# Patient Record
Sex: MALE | Race: WHITE | NOT HISPANIC OR LATINO | URBAN - NONMETROPOLITAN AREA
[De-identification: names, ages, dates, MRNs, and addresses within clinical notes are randomized per-mention and may not be internally consistent; named-entity substitution may affect disease eponyms.]

---

## 2023-05-30 ENCOUNTER — TRANSCRIBE ORDERS (OUTPATIENT)
Dept: CARDIOLOGY CLINIC | Facility: CLINIC | Age: 58
End: 2023-05-30

## 2023-05-30 ENCOUNTER — TELEPHONE (OUTPATIENT)
Dept: UROLOGY | Facility: CLINIC | Age: 58
End: 2023-05-30

## 2023-05-30 DIAGNOSIS — R35.1 NOCTURIA: Primary | ICD-10-CM

## 2023-06-01 ENCOUNTER — TELEPHONE (OUTPATIENT)
Dept: UROLOGY | Facility: MEDICAL CENTER | Age: 58
End: 2023-06-01

## 2023-06-01 NOTE — TELEPHONE ENCOUNTER
Please Triage -   New Patient- Mary    What is the reason for the patients appointment? patient was referred to see Urology for Nocturia        Imaging/Lab Results:      Do we accept the patient's insurance or is the patient Self-Pay? Provider & Plan:pam   Member ID#: He will call back w insurance   Has the patient had any previous urologist(s)?no        Have patient records been requested?ine pic       Has the patient had any outside testing done?   In epic     Does the patient have a personal history of cancer?no       Patient can be reached at :
Detail Level: Zone

## 2023-06-24 PROBLEM — R35.1 NOCTURIA: Status: ACTIVE | Noted: 2023-06-24

## 2023-06-24 NOTE — PROGRESS NOTES
UROLOGY PROGRESS NOTE         NAME: Autumn Toro  AGE: 59 y o  SEX: male  : 1965   MRN: [id number]    DATE: 2023  TIME: 9:12 AM    Assessment and Plan   Procedures     Impression:   1  Nocturia  -     Ambulatory referral to Urology    2  Erectile dysfunction, unspecified erectile dysfunction type    3  Dysuria    4  Urinary urgency         Plan: We will check a urine culture, will start oxybutynin to see if it helps his voiding symptoms  Patient is going to try his best to get his A1c and diabetes controlled  Regarding his Viagra I recommend him taking 20 mg tablets an hour before foreplay on an empty stomach and see if that improves  So we will check a PSA reevaluate patient in 3 months    Chief Complaint   No chief complaint on file  History of Present Illness     HPI: Autumn Toro is a 59y o  year old male who presents with evaluation for nocturia  Per telephone call no previous  medical or surgical history  Do not see any recent PSAs on the chart    Patient complains of suprapubic pressure for about a year good flow no hematuria no dysuria some postvoid dribbling some urgency frequency variable nocturia 1-4  The following portions of the patient's history were reviewed and updated as appropriate: allergies, current medications, past family history, past medical history, past social history, past surgical history and problem list   Past Medical History:   Diagnosis Date   • Dyslipidemia    • Hypertension    • Type 2 diabetes mellitus ([facility] [address] )      Past Surgical History:   Procedure Laterality Date   • ANKLE SURGERY Left    • COLONOSCOPY       shoulder  Review of Systems     Const: Denies chills, fever and weight loss  CV: Denies chest pain  Resp: Denies SOB  GI: Denies abdominal pain, nausea and vomiting  : Denies symptoms other than stated above  Musculo: Denies back pain      Objective   /80 (BP Location: Left arm, Patient Position: Sitting, Cuff Size: "Standard)   Pulse 74   Temp (!) 97 2 °F (36 2 °C)   Ht 5' 8\" (1 727 m)   Wt 110 kg (243 lb)   SpO2 98%   BMI 36 95 kg/m²     Physical Exam  Const: Appears healthy and well developed  No signs of acute distress present  Resp: Respirations are regular and unlabored  CV: Rate is regular  Rhythm is regular  Abdomen: Abdomen is soft, nontender, and nondistended  Kidneys are not palpable  : He had a normal external genitalia exam the prostate was really normal size consistency no mass or nodule  Psych: Patient's attitude is cooperative   Mood is normal  Affect is normal     Current Medications     Current Outpatient Medications:   •  amLODIPine (NORVASC) 5 mg tablet, Take 10 mg by mouth daily, Disp: , Rfl:   •  Aspirin 81 MG CAPS, Take by mouth, Disp: , Rfl:   •  ergocalciferol (VITAMIN D2) 50,000 units, TAKE 1 CAPSULE BY MOUTH TWICE MONTHLY, Disp: , Rfl:   •  glimepiride (AMARYL) 4 mg tablet, , Disp: , Rfl:   •  Jardiance 25 MG TABS, Take 10 mg by mouth every morning, Disp: , Rfl:   •  losartan (COZAAR) 50 mg tablet, Take 100 mg by mouth daily, Disp: , Rfl:   •  metFORMIN (GLUCOPHAGE) 500 mg tablet, Take 500 mg by mouth 2 (two) times a day with meals, Disp: , Rfl:   •  sildenafil (VIAGRA) 100 mg tablet, PLEASE SEE ATTACHED FOR DETAILED DIRECTIONS, Disp: , Rfl:         Stanley Davila MD        "

## 2023-06-26 ENCOUNTER — TELEPHONE (OUTPATIENT)
Dept: UROLOGY | Facility: CLINIC | Age: 58
End: 2023-06-26

## 2023-06-26 RX ORDER — SILDENAFIL 100 MG/1
TABLET, FILM COATED ORAL
COMMUNITY
Start: 2023-05-24

## 2023-06-26 RX ORDER — AMLODIPINE BESYLATE 5 MG/1
10 TABLET ORAL DAILY
COMMUNITY
Start: 2023-04-22

## 2023-06-26 RX ORDER — GLIMEPIRIDE 4 MG/1
TABLET ORAL
COMMUNITY
Start: 2023-05-25

## 2023-06-26 RX ORDER — EMPAGLIFLOZIN 25 MG/1
10 TABLET, FILM COATED ORAL EVERY MORNING
COMMUNITY
Start: 2023-05-24

## 2023-06-26 RX ORDER — ERGOCALCIFEROL 1.25 MG/1
CAPSULE ORAL
COMMUNITY
Start: 2023-03-21

## 2023-06-26 RX ORDER — LOSARTAN POTASSIUM 50 MG/1
100 TABLET ORAL DAILY
COMMUNITY

## 2023-06-28 ENCOUNTER — OFFICE VISIT (OUTPATIENT)
Dept: UROLOGY | Facility: CLINIC | Age: 58
End: 2023-06-28
Payer: COMMERCIAL

## 2023-06-28 VITALS
WEIGHT: 243 LBS | BODY MASS INDEX: 36.83 KG/M2 | HEIGHT: 68 IN | OXYGEN SATURATION: 98 % | TEMPERATURE: 97.2 F | HEART RATE: 74 BPM | DIASTOLIC BLOOD PRESSURE: 80 MMHG | SYSTOLIC BLOOD PRESSURE: 134 MMHG

## 2023-06-28 DIAGNOSIS — R30.0 DYSURIA: ICD-10-CM

## 2023-06-28 DIAGNOSIS — R35.1 NOCTURIA: Primary | ICD-10-CM

## 2023-06-28 DIAGNOSIS — N52.9 ERECTILE DYSFUNCTION, UNSPECIFIED ERECTILE DYSFUNCTION TYPE: ICD-10-CM

## 2023-06-28 DIAGNOSIS — R39.15 URINARY URGENCY: ICD-10-CM

## 2023-06-28 LAB
SL AMB  POCT GLUCOSE, UA: ABNORMAL
SL AMB LEUKOCYTE ESTERASE,UA: ABNORMAL
SL AMB POCT BILIRUBIN,UA: ABNORMAL
SL AMB POCT BLOOD,UA: ABNORMAL
SL AMB POCT CLARITY,UA: CLEAR
SL AMB POCT COLOR,UA: YELLOW
SL AMB POCT KETONES,UA: ABNORMAL
SL AMB POCT NITRITE,UA: ABNORMAL
SL AMB POCT PH,UA: 5
SL AMB POCT SPECIFIC GRAVITY,UA: 1.01
SL AMB POCT URINE PROTEIN: ABNORMAL
SL AMB POCT UROBILINOGEN: 0.2

## 2023-06-28 PROCEDURE — 99204 OFFICE O/P NEW MOD 45 MIN: CPT | Performed by: UROLOGY

## 2023-06-28 PROCEDURE — 87086 URINE CULTURE/COLONY COUNT: CPT | Performed by: UROLOGY

## 2023-06-28 PROCEDURE — 81003 URINALYSIS AUTO W/O SCOPE: CPT | Performed by: UROLOGY

## 2023-06-28 RX ORDER — OXYBUTYNIN CHLORIDE 10 MG/1
10 TABLET, EXTENDED RELEASE ORAL
Qty: 90 TABLET | Refills: 3 | Status: SHIPPED | OUTPATIENT
Start: 2023-06-28

## 2023-06-28 RX ORDER — SILDENAFIL CITRATE 20 MG/1
TABLET ORAL
Qty: 90 TABLET | Refills: 3 | Status: SHIPPED | OUTPATIENT
Start: 2023-06-28

## 2023-06-29 LAB — BACTERIA UR CULT: NORMAL

## 2023-07-12 ENCOUNTER — APPOINTMENT (OUTPATIENT)
Dept: LAB | Facility: HOSPITAL | Age: 58
End: 2023-07-12
Payer: COMMERCIAL

## 2023-07-12 DIAGNOSIS — R35.1 NOCTURIA: ICD-10-CM

## 2023-07-12 LAB — PSA SERPL-MCNC: 0.62 NG/ML (ref 0–4)

## 2023-07-12 PROCEDURE — 84153 ASSAY OF PSA TOTAL: CPT

## 2023-07-12 PROCEDURE — 36415 COLL VENOUS BLD VENIPUNCTURE: CPT

## 2023-10-06 NOTE — PROGRESS NOTES
UROLOGY PROGRESS NOTE         NAME: Tootie Giron  AGE: 59 y.o. SEX: male  : 1965   MRN: [id number]    DATE: 10/6/2023  TIME: 4:44 PM    Assessment and Plan   Procedures     Impression:   1. Nocturia    2. Erectile dysfunction, unspecified erectile dysfunction type    3. Urinary urgency    4. Dysuria         Plan: Change from sildenafil to tadalafil 20 mg dose. Set patient up for cystoscopy to evaluate suprapubic pressure in a.m. Also recommend a 14-day course of Mobic 15 mg and a heating pad to that area till the cystoscopy can be completed. Chief Complaint   No chief complaint on file. History of Present Illness     HPI: Tootie Giron is a 59y.o. year old male who presents with office visit with me on 2023. Patient with a history nocturia, ED, dysuria, urinary urgency. The plan was to start him on oxybutynin and recommended better diabetic control. He was recommended sildenafil for erectile dysfunction. His PSA 2023 was normal at 0.62 and urine culture at the time of the office visit was normal.  Currently, patient still with suprapubic pressure in the morning gets better during the day. Nocturia is improved down to 1. He states his sugar is improved as well. He is not having leakage no hematuria no dysuria his flows reasonably good. Regarding erectile dysfunction no luck with sildenafil is tried up to 4 tablets has a girlfriend.   His libido is good got a headache with the higher dose            The following portions of the patient's history were reviewed and updated as appropriate: allergies, current medications, past family history, past medical history, past social history, past surgical history and problem list.  Past Medical History:   Diagnosis Date    Dyslipidemia     Hypertension     Type 2 diabetes mellitus ([address])      Past Surgical History:   Procedure Laterality Date    ANKLE SURGERY Left     COLONOSCOPY       shoulder  Review of Systems     Const: Denies chills, fever and weight loss. CV: Denies chest pain. Resp: Denies SOB. GI: Denies abdominal pain, nausea and vomiting. : Denies symptoms other than stated above. Musculo: Denies back pain. Objective   There were no vitals taken for this visit. Physical Exam  Const: Appears healthy and well developed. No signs of acute distress present. Resp: Respirations are regular and unlabored. CV: Rate is regular. Rhythm is regular. Abdomen: Abdomen is soft, nontender, and nondistended. Kidneys are not palpable. : Normal external exam PVR was low there was suprapubic pressure  Psych: Patient's attitude is cooperative. Mood is normal. Affect is normal.    Current Medications     Current Outpatient Medications:     amLODIPine (NORVASC) 5 mg tablet, Take 10 mg by mouth daily, Disp: , Rfl:     Aspirin 81 MG CAPS, Take by mouth, Disp: , Rfl:     ergocalciferol (VITAMIN D2) 50,000 units, TAKE 1 CAPSULE BY MOUTH TWICE MONTHLY, Disp: , Rfl:     glimepiride (AMARYL) 4 mg tablet, , Disp: , Rfl:     Jardiance 25 MG TABS, Take 10 mg by mouth every morning, Disp: , Rfl:     losartan (COZAAR) 50 mg tablet, Take 100 mg by mouth daily, Disp: , Rfl:     metFORMIN (GLUCOPHAGE) 500 mg tablet, Take 500 mg by mouth 2 (two) times a day with meals, Disp: , Rfl:     oxybutynin (DITROPAN-XL) 10 MG 24 hr tablet, Take 1 tablet (10 mg total) by mouth daily at bedtime, Disp: 90 tablet, Rfl: 3    sildenafil (REVATIO) 20 mg tablet, Take anywhere from 1 to 5 tablets but no more than 5 tablets in a 24-hour period. Do not drink alcohol with this product and take on empty stomach or wait 2 hours after eating to take the medication. , Disp: 90 tablet, Rfl: 3    sildenafil (VIAGRA) 100 mg tablet, PLEASE SEE ATTACHED FOR DETAILED DIRECTIONS, Disp: , Rfl:         Robert Quinn MD

## 2023-10-09 RX ORDER — ROSUVASTATIN CALCIUM 10 MG/1
10 TABLET, COATED ORAL DAILY
COMMUNITY
Start: 2023-07-26

## 2023-10-09 RX ORDER — LORATADINE 10 MG/1
TABLET ORAL
COMMUNITY

## 2023-10-11 ENCOUNTER — OFFICE VISIT (OUTPATIENT)
Dept: UROLOGY | Facility: CLINIC | Age: 58
End: 2023-10-11
Payer: COMMERCIAL

## 2023-10-11 VITALS
TEMPERATURE: 98 F | WEIGHT: 229.2 LBS | OXYGEN SATURATION: 99 % | DIASTOLIC BLOOD PRESSURE: 60 MMHG | HEIGHT: 68 IN | BODY MASS INDEX: 34.74 KG/M2 | HEART RATE: 78 BPM | SYSTOLIC BLOOD PRESSURE: 142 MMHG

## 2023-10-11 DIAGNOSIS — R10.2 SUPRAPUBIC PRESSURE: ICD-10-CM

## 2023-10-11 DIAGNOSIS — E11.9 TYPE 2 DIABETES MELLITUS WITH HEMOGLOBIN A1C GOAL OF LESS THAN 7.0% (HCC): ICD-10-CM

## 2023-10-11 DIAGNOSIS — R35.1 NOCTURIA: Primary | ICD-10-CM

## 2023-10-11 DIAGNOSIS — R39.15 URINARY URGENCY: ICD-10-CM

## 2023-10-11 DIAGNOSIS — R30.0 DYSURIA: ICD-10-CM

## 2023-10-11 DIAGNOSIS — N52.9 ERECTILE DYSFUNCTION, UNSPECIFIED ERECTILE DYSFUNCTION TYPE: ICD-10-CM

## 2023-10-11 PROCEDURE — 99214 OFFICE O/P EST MOD 30 MIN: CPT | Performed by: UROLOGY

## 2023-10-11 RX ORDER — MELOXICAM 15 MG/1
15 TABLET ORAL DAILY
Qty: 14 TABLET | Refills: 0 | Status: SHIPPED | OUTPATIENT
Start: 2023-10-11

## 2023-10-11 RX ORDER — TADALAFIL 20 MG/1
20 TABLET ORAL DAILY PRN
Qty: 30 TABLET | Refills: 3 | Status: SHIPPED | OUTPATIENT
Start: 2023-10-11

## 2023-10-11 RX ORDER — BLOOD-GLUCOSE SENSOR
EACH MISCELLANEOUS
COMMUNITY
Start: 2023-08-14

## 2023-11-07 ENCOUNTER — APPOINTMENT (INPATIENT)
Dept: RADIOLOGY | Facility: HOSPITAL | Age: 58
DRG: 153 | End: 2023-11-07
Payer: COMMERCIAL

## 2023-11-07 ENCOUNTER — APPOINTMENT (INPATIENT)
Dept: MRI IMAGING | Facility: HOSPITAL | Age: 58
DRG: 153 | End: 2023-11-07
Payer: COMMERCIAL

## 2023-11-07 ENCOUNTER — HOSPITAL ENCOUNTER (INPATIENT)
Facility: HOSPITAL | Age: 58
LOS: 1 days | Discharge: HOME | DRG: 153 | End: 2023-11-08
Attending: EMERGENCY MEDICINE | Admitting: INTERNAL MEDICINE
Payer: COMMERCIAL

## 2023-11-07 ENCOUNTER — APPOINTMENT (INPATIENT)
Dept: NON INVASIVE DIAGNOSTICS | Facility: HOSPITAL | Age: 58
DRG: 153 | End: 2023-11-07
Payer: COMMERCIAL

## 2023-11-07 ENCOUNTER — APPOINTMENT (EMERGENCY)
Dept: CT IMAGING | Facility: HOSPITAL | Age: 58
DRG: 153 | End: 2023-11-07
Payer: COMMERCIAL

## 2023-11-07 DIAGNOSIS — R07.9 CHEST PAIN: ICD-10-CM

## 2023-11-07 DIAGNOSIS — R93.1 ABNORMAL ECHOCARDIOGRAM: ICD-10-CM

## 2023-11-07 DIAGNOSIS — J01.80 OTHER ACUTE SINUSITIS, RECURRENCE NOT SPECIFIED: ICD-10-CM

## 2023-11-07 DIAGNOSIS — I77.9 VERTEBRAL ARTERY DISEASE (HCC): ICD-10-CM

## 2023-11-07 DIAGNOSIS — R42 DIZZINESS: Primary | ICD-10-CM

## 2023-11-07 PROBLEM — M79.601 PARESTHESIA AND PAIN OF BOTH UPPER EXTREMITIES: Status: ACTIVE | Noted: 2023-11-07

## 2023-11-07 PROBLEM — R20.2 PARESTHESIA AND PAIN OF BOTH UPPER EXTREMITIES: Status: ACTIVE | Noted: 2023-11-07

## 2023-11-07 PROBLEM — I65.09 VERTEBRAL ARTERY STENOSIS: Status: ACTIVE | Noted: 2023-11-07

## 2023-11-07 PROBLEM — E78.5 HYPERLIPIDEMIA: Status: ACTIVE | Noted: 2023-11-07

## 2023-11-07 PROBLEM — M79.602 PARESTHESIA AND PAIN OF BOTH UPPER EXTREMITIES: Status: ACTIVE | Noted: 2023-11-07

## 2023-11-07 PROBLEM — I10 HYPERTENSION: Status: ACTIVE | Noted: 2023-11-07

## 2023-11-07 LAB
ANION GAP SERPL CALCULATED.3IONS-SCNC: 7 MMOL/L
APTT PPP: 28 SECONDS (ref 23–37)
ATRIAL RATE: 89 BPM
BUN SERPL-MCNC: 14 MG/DL (ref 5–25)
CALCIUM SERPL-MCNC: 9.3 MG/DL (ref 8.4–10.2)
CARDIAC TROPONIN I PNL SERPL HS: 2 NG/L
CHLORIDE SERPL-SCNC: 102 MMOL/L (ref 96–108)
CO2 SERPL-SCNC: 27 MMOL/L (ref 21–32)
CREAT SERPL-MCNC: 0.99 MG/DL (ref 0.6–1.3)
CRP SERPL QL: 1.6 MG/L
ERYTHROCYTE [DISTWIDTH] IN BLOOD BY AUTOMATED COUNT: 13.4 % (ref 11.6–15.1)
FLUAV RNA RESP QL NAA+PROBE: NEGATIVE
FLUBV RNA RESP QL NAA+PROBE: NEGATIVE
GFR SERPL CREATININE-BSD FRML MDRD: 82 ML/MIN/1.73SQ M
GLUCOSE SERPL-MCNC: 130 MG/DL (ref 65–140)
GLUCOSE SERPL-MCNC: 168 MG/DL (ref 65–140)
GLUCOSE SERPL-MCNC: 189 MG/DL (ref 65–140)
GLUCOSE SERPL-MCNC: 200 MG/DL (ref 65–140)
HCT VFR BLD AUTO: 48.2 % (ref 36.5–49.3)
HCYS SERPL-SCNC: 11.7 UMOL/L (ref 5–15)
HGB BLD-MCNC: 16.2 G/DL (ref 12–17)
INR PPP: 0.93 (ref 0.84–1.19)
MCH RBC QN AUTO: 30.4 PG (ref 26.8–34.3)
MCHC RBC AUTO-ENTMCNC: 33.6 G/DL (ref 31.4–37.4)
MCV RBC AUTO: 90 FL (ref 82–98)
P AXIS: 39 DEGREES
PLATELET # BLD AUTO: 229 THOUSANDS/UL (ref 149–390)
PMV BLD AUTO: 9.4 FL (ref 8.9–12.7)
POTASSIUM SERPL-SCNC: 5.1 MMOL/L (ref 3.5–5.3)
PR INTERVAL: 164 MS
PROTHROMBIN TIME: 12.8 SECONDS (ref 11.6–14.5)
QRS AXIS: -9 DEGREES
QRSD INTERVAL: 84 MS
QT INTERVAL: 350 MS
QTC INTERVAL: 425 MS
RBC # BLD AUTO: 5.33 MILLION/UL (ref 3.88–5.62)
RSV RNA RESP QL NAA+PROBE: NEGATIVE
SARS-COV-2 RNA RESP QL NAA+PROBE: NEGATIVE
SODIUM SERPL-SCNC: 136 MMOL/L (ref 135–147)
T WAVE AXIS: 40 DEGREES
TSH SERPL DL<=0.05 MIU/L-ACNC: 2.02 UIU/ML (ref 0.45–4.5)
VENTRICULAR RATE: 89 BPM
VIT B12 SERPL-MCNC: 303 PG/ML (ref 180–914)
WBC # BLD AUTO: 7.49 THOUSAND/UL (ref 4.31–10.16)

## 2023-11-07 PROCEDURE — 80048 BASIC METABOLIC PNL TOTAL CA: CPT | Performed by: EMERGENCY MEDICINE

## 2023-11-07 PROCEDURE — 70496 CT ANGIOGRAPHY HEAD: CPT

## 2023-11-07 PROCEDURE — 93005 ELECTROCARDIOGRAM TRACING: CPT

## 2023-11-07 PROCEDURE — 83090 ASSAY OF HOMOCYSTEINE: CPT | Performed by: INTERNAL MEDICINE

## 2023-11-07 PROCEDURE — 82948 REAGENT STRIP/BLOOD GLUCOSE: CPT

## 2023-11-07 PROCEDURE — 99285 EMERGENCY DEPT VISIT HI MDM: CPT | Performed by: EMERGENCY MEDICINE

## 2023-11-07 PROCEDURE — 93010 ELECTROCARDIOGRAM REPORT: CPT | Performed by: INTERNAL MEDICINE

## 2023-11-07 PROCEDURE — 36415 COLL VENOUS BLD VENIPUNCTURE: CPT | Performed by: EMERGENCY MEDICINE

## 2023-11-07 PROCEDURE — 85027 COMPLETE CBC AUTOMATED: CPT | Performed by: EMERGENCY MEDICINE

## 2023-11-07 PROCEDURE — 85610 PROTHROMBIN TIME: CPT | Performed by: EMERGENCY MEDICINE

## 2023-11-07 PROCEDURE — 84443 ASSAY THYROID STIM HORMONE: CPT | Performed by: INTERNAL MEDICINE

## 2023-11-07 PROCEDURE — 99285 EMERGENCY DEPT VISIT HI MDM: CPT

## 2023-11-07 PROCEDURE — 82607 VITAMIN B-12: CPT | Performed by: INTERNAL MEDICINE

## 2023-11-07 PROCEDURE — 93306 TTE W/DOPPLER COMPLETE: CPT | Performed by: STUDENT IN AN ORGANIZED HEALTH CARE EDUCATION/TRAINING PROGRAM

## 2023-11-07 PROCEDURE — 70551 MRI BRAIN STEM W/O DYE: CPT

## 2023-11-07 PROCEDURE — 85730 THROMBOPLASTIN TIME PARTIAL: CPT | Performed by: EMERGENCY MEDICINE

## 2023-11-07 PROCEDURE — 86140 C-REACTIVE PROTEIN: CPT | Performed by: INTERNAL MEDICINE

## 2023-11-07 PROCEDURE — 99223 1ST HOSP IP/OBS HIGH 75: CPT | Performed by: INTERNAL MEDICINE

## 2023-11-07 PROCEDURE — 70498 CT ANGIOGRAPHY NECK: CPT

## 2023-11-07 PROCEDURE — 0241U HB NFCT DS VIR RESP RNA 4 TRGT: CPT | Performed by: EMERGENCY MEDICINE

## 2023-11-07 PROCEDURE — 84484 ASSAY OF TROPONIN QUANT: CPT | Performed by: EMERGENCY MEDICINE

## 2023-11-07 PROCEDURE — 71045 X-RAY EXAM CHEST 1 VIEW: CPT

## 2023-11-07 PROCEDURE — 93306 TTE W/DOPPLER COMPLETE: CPT

## 2023-11-07 RX ORDER — AMLODIPINE BESYLATE 10 MG/1
10 TABLET ORAL DAILY
Status: DISCONTINUED | OUTPATIENT
Start: 2023-11-08 | End: 2023-11-08 | Stop reason: HOSPADM

## 2023-11-07 RX ORDER — ASPIRIN 81 MG/1
324 TABLET, CHEWABLE ORAL ONCE
Status: COMPLETED | OUTPATIENT
Start: 2023-11-07 | End: 2023-11-07

## 2023-11-07 RX ORDER — LORAZEPAM 2 MG/ML
1 INJECTION INTRAMUSCULAR ONCE AS NEEDED
Status: DISCONTINUED | OUTPATIENT
Start: 2023-11-07 | End: 2023-11-08 | Stop reason: HOSPADM

## 2023-11-07 RX ORDER — OXYBUTYNIN CHLORIDE 5 MG/1
10 TABLET, EXTENDED RELEASE ORAL
Status: DISCONTINUED | OUTPATIENT
Start: 2023-11-07 | End: 2023-11-08 | Stop reason: HOSPADM

## 2023-11-07 RX ORDER — OXYCODONE HYDROCHLORIDE 5 MG/1
5 TABLET ORAL EVERY 4 HOURS PRN
Status: DISCONTINUED | OUTPATIENT
Start: 2023-11-07 | End: 2023-11-08 | Stop reason: HOSPADM

## 2023-11-07 RX ORDER — ATORVASTATIN CALCIUM 40 MG/1
40 TABLET, FILM COATED ORAL EVERY EVENING
Status: DISCONTINUED | OUTPATIENT
Start: 2023-11-07 | End: 2023-11-08 | Stop reason: HOSPADM

## 2023-11-07 RX ORDER — HEPARIN SODIUM 5000 [USP'U]/ML
5000 INJECTION, SOLUTION INTRAVENOUS; SUBCUTANEOUS EVERY 8 HOURS SCHEDULED
Status: DISCONTINUED | OUTPATIENT
Start: 2023-11-07 | End: 2023-11-08 | Stop reason: HOSPADM

## 2023-11-07 RX ORDER — METHOCARBAMOL 500 MG/1
500 TABLET, FILM COATED ORAL EVERY 8 HOURS SCHEDULED
Status: DISCONTINUED | OUTPATIENT
Start: 2023-11-07 | End: 2023-11-08 | Stop reason: HOSPADM

## 2023-11-07 RX ORDER — ASPIRIN 81 MG/1
81 TABLET, CHEWABLE ORAL DAILY
Status: DISCONTINUED | OUTPATIENT
Start: 2023-11-08 | End: 2023-11-08 | Stop reason: HOSPADM

## 2023-11-07 RX ORDER — LOSARTAN POTASSIUM 50 MG/1
100 TABLET ORAL DAILY
Status: DISCONTINUED | OUTPATIENT
Start: 2023-11-08 | End: 2023-11-08 | Stop reason: HOSPADM

## 2023-11-07 RX ORDER — ACETAMINOPHEN 325 MG/1
650 TABLET ORAL EVERY 4 HOURS PRN
Status: DISCONTINUED | OUTPATIENT
Start: 2023-11-07 | End: 2023-11-08 | Stop reason: HOSPADM

## 2023-11-07 RX ORDER — INSULIN LISPRO 100 [IU]/ML
1-6 INJECTION, SOLUTION INTRAVENOUS; SUBCUTANEOUS
Status: DISCONTINUED | OUTPATIENT
Start: 2023-11-07 | End: 2023-11-08 | Stop reason: HOSPADM

## 2023-11-07 RX ADMIN — ASPIRIN 81 MG 324 MG: 81 TABLET ORAL at 12:48

## 2023-11-07 RX ADMIN — HEPARIN SODIUM 5000 UNITS: 5000 INJECTION INTRAVENOUS; SUBCUTANEOUS at 21:19

## 2023-11-07 RX ADMIN — ATORVASTATIN CALCIUM 40 MG: 40 TABLET, FILM COATED ORAL at 17:20

## 2023-11-07 RX ADMIN — METHOCARBAMOL TABLETS 500 MG: 500 TABLET, COATED ORAL at 17:20

## 2023-11-07 RX ADMIN — HEPARIN SODIUM 5000 UNITS: 5000 INJECTION INTRAVENOUS; SUBCUTANEOUS at 15:28

## 2023-11-07 RX ADMIN — OXYBUTYNIN CHLORIDE 10 MG: 5 TABLET, EXTENDED RELEASE ORAL at 21:19

## 2023-11-07 RX ADMIN — INSULIN LISPRO 1 UNITS: 100 INJECTION, SOLUTION INTRAVENOUS; SUBCUTANEOUS at 16:06

## 2023-11-07 RX ADMIN — IOHEXOL 85 ML: 350 INJECTION, SOLUTION INTRAVENOUS at 10:57

## 2023-11-07 NOTE — H&P
[facility]  H&P  Name: Andreas Rm 59 y.o. male I MRN: [id number]  Unit/Bed#: -[facility]ie I Date of Admission: 11/7/2023   Date of Service: 11/7/2023 I Hospital Day: 0      Assessment/Plan   Dizziness and giddiness  Assessment & Plan  History of diabetes hypertension and hyperlipidemia who presents with intermittent headache/neck pain as well as upper extremity paresthesias  MRI of brain negative for CVA. Discussed with neurology, does not need stroke pathway. His symptoms of upper extremity paresthesias may be radicular. Will check MRI of cervical spine. Dizziness may be vertigo versus positional.  Given use of ED medications, will check orthostatic vitals to rule out orthostatic hypotension  Neurology recommends placement on telemetry to rule out dysrhythmias which may be causing symptoms. Paresthesia and pain of both upper extremities  Assessment & Plan  Stages of both upper extremities. Patient is a  for over 40 years and also rides his [address] He notices his symptoms of paresthesias are worse when he rides his motorcycle  May have carpal tunnel syndrome. Given presence of neck pain will check MRI of cervical spine to rule out radiculopathy  Discussed with neurology who also recommends checking B12    Vertebral artery stenosis  Assessment & Plan  Question of significance. Can see neurology as an outpatient. Hyperlipidemia  Assessment & Plan  Will substitute rosuvastatin with atorvastatin based on hospital formulary    Hypertension  Assessment & Plan  Given lack of evidence for ischemic stroke, does not need permissive hypertension. Continue amlodipine and losartan as previously taken.     Type 2 diabetes mellitus with hemoglobin A1c goal of less than 7.0% [facility])  Assessment & Plan  Lab Results   Component Value Date    HGBA1C 7.1 (H) 08/23/2023     Recent Labs     11/07/23  1044 11/07/23  1527   POCGLU 189* 168*     Follows with [facility]. Recently glimepiride discontinued. Prior to admission on jardiance semaglutide and metformin. All will be held in favor of sliding scale insulin during hospitalization. Erectile dysfunction  Assessment & Plan  Prior to admission was taking tadalafil which may be causing headache/hypotension. Will check orthostatic vitals. VTE Pharmacologic Prophylaxis: VTE Score: 6 High Risk (Score >/= 5) - Pharmacological DVT Prophylaxis Ordered: heparin. Sequential Compression Devices Ordered. Code Status: Level 1 - Full Code  Discussion with family: Updated  (daughter) at bedside. Anticipated Length of Stay: Patient will be admitted on an inpatient basis with an anticipated length of stay of greater than 2 midnights secondary to strokelike symptoms. Total Time Spent on Date of Encounter in care of patient: This time was spent on one or more of the following: performing physical exam; counseling and coordination of care; obtaining or reviewing history; documenting in the medical record; reviewing/ordering tests, medications or procedures; communicating with other healthcare professionals and discussing with patient's family/caregivers. Chief Complaint:     Dizziness (Patient started with dizziness this morning. Patient had an episode of left hand numbness on Sunday which resolved. Patient having headaches for months. )    History of Present Illness:    Rosalinda Hein is a 59 y.o. male with a past medical history of diabetes hypertension and hyperlipidemia who presents with headache dizziness and upper extremity paresthesias. The patient has been a  for 40 years and does ride a motorcycle. Over the past 2 months he has had paresthesias of his upper extremities which she attributes to riding his motorcycle. He recently also started having headaches with neck pain over the past 2 months as well.   He had profound dizziness last night and when he was going to get breakfast with his son this morning he was a little bit slower to respond than usual so he came to the hospital where a stroke alert was called. This was subsequently called off as he was not a TNK candidate. Since admission he has had a CT, CTA, and MRI of brain ruling out cerebral infarct. Upon review of records he has been started on ED medications. Review of Systems:  Review of Systems   Constitutional:  Negative for chills and fever. HENT:  Negative for facial swelling and trouble swallowing. Eyes:  Negative for photophobia and visual disturbance. Respiratory:  Negative for shortness of breath. Cardiovascular:  Negative for chest pain and palpitations. Gastrointestinal:  Negative for abdominal distention, abdominal pain, diarrhea, nausea and vomiting. Genitourinary:  Negative for hematuria. Musculoskeletal:  Positive for back pain (Neck). Negative for myalgias. Skin:  Negative for rash. Neurological:  Positive for dizziness, light-headedness, numbness (Upper extremities) and headaches. Negative for seizures and speech difficulty. Psychiatric/Behavioral:  The patient is not nervous/anxious. All other systems reviewed and are negative. Past Medical and Surgical History:   Past Medical History:   Diagnosis Date    Dyslipidemia     Erectile dysfunction     Hypertension     Type 2 diabetes mellitus ([address])      Past Surgical History:   Procedure Laterality Date    ANKLE SURGERY Left     COLONOSCOPY       Meds/Allergies: Allergies: Allergies   Allergen Reactions    Hydrochlorothiazide GI Intolerance    Lisinopril Cough     Prior to Admission Medications   Prescriptions Last Dose Informant Patient Reported? Taking?    Aspirin 81 MG CAPS 11/6/2023 Self Yes Yes   Sig: Take by mouth   Continuous Blood Gluc Sensor (FreeStyle Tyrell 3 Sensor) MISC  Self Yes No   Sig: Use as directed   Jardiance 25 MG TABS 11/6/2023 Self Yes Yes   Sig: Take 10 mg by mouth every morning   amLODIPine (NORVASC) 5 mg tablet 11/6/2023 Self Yes Yes   Sig: Take 10 mg by mouth daily   ergocalciferol (VITAMIN D2) 50,000 units Past Week Self Yes Yes   Sig: TAKE 1 CAPSULE BY MOUTH TWICE MONTHLY   loratadine (Claritin) 10 mg tablet Past Month Self Yes Yes   Sig: Take 10 mg by mouth if needed for allergies   losartan (COZAAR) 50 mg tablet 11/6/2023 Self Yes Yes   Sig: Take 100 mg by mouth daily   meloxicam (Mobic) 15 mg tablet 11/6/2023  No Yes   Sig: Take 1 tablet (15 mg total) by mouth daily   metFORMIN (GLUCOPHAGE) 500 mg tablet 11/6/2023 Self Yes Yes   Sig: Take 500 mg by mouth 2 (two) times a day with meals   oxybutynin (DITROPAN-XL) 10 MG 24 hr tablet 11/6/2023 Self No Yes   Sig: Take 1 tablet (10 mg total) by mouth daily at bedtime   rosuvastatin (CRESTOR) 10 MG tablet 11/6/2023 Self Yes Yes   Sig: Take 10 mg by mouth daily   semaglutide, 0.25 or 0.5 mg/dose, (Ozempic, 0.25 or 0.5 MG/DOSE,) 2 mg/3 mL injection pen Past Week Self Yes Yes   Sig: Inject under the skin every 7 days   tadalafil (CIALIS) 20 MG tablet Past Week  No Yes   Sig: Take 1 tablet (20 mg total) by mouth daily as needed for erectile dysfunction      Facility-Administered Medications: None     Social History:     Social History     Socioeconomic History    Marital status: /Civil Union     Spouse name: Not on file    Number of children: Not on file    Years of education: Not on file    Highest education level: Not on file   Occupational History    Not on file   Tobacco Use    Smoking status: Never    Smokeless tobacco: Never   Vaping Use    Vaping Use: Never used   Substance and Sexual Activity    Alcohol use: Not Currently    Drug use: Not Currently    Sexual activity: Yes   Other Topics Concern    Not on file   Social History Narrative    Not on file     Social Determinants of Health     Financial Resource Strain: Not on file   Food Insecurity: No Food Insecurity (11/7/2023)    Hunger Vital Sign     Worried About Running Out of Food in the Last Year: Never true     [address] in the Last Year: Never true   Transportation Needs: No Transportation Needs (11/7/2023)    PRAPARE - Transportation     Lack of Transportation (Medical): No     Lack of Transportation (Non-Medical): No   Physical Activity: Not on file   Stress: Not on file   Social Connections: Not on file   Intimate Partner Violence: Not on file   Housing Stability: Low Risk  (11/7/2023)    Housing Stability Vital Sign     Unable to Pay for Housing in the Last Year: No     Number of Places Lived in the Last Year: 1     Unstable Housing in the Last Year: No     Patient Pre-hospital Living Situation:   Patient Pre-hospital Level of Mobility:   Patient Pre-hospital Diet Restrictions:     Family History:  History reviewed. No pertinent family history. Physical Exam:   Vitals:   Blood Pressure: 145/83 (11/07/23 1630)  Pulse: 81 (11/07/23 1630)  Temperature: 97.9 °F (36.6 °C) (11/07/23 1630)  Temp Source: Temporal (11/07/23 1630)  Respirations: 18 (11/07/23 1630)  Height: 5' 8" (172.7 cm) (11/07/23 1256)  Weight - Scale: 106 kg (233 lb 11 oz) (11/07/23 1256)  SpO2: 95 % (11/07/23 1530)    Physical Exam  Vitals reviewed. Constitutional:       General: He is not in acute distress. HENT:      Head: Atraumatic. Nose: Nose normal.      Mouth/Throat:      Mouth: Mucous membranes are moist.   Eyes:      Extraocular Movements: Extraocular movements intact. Cardiovascular:      Rate and Rhythm: Regular rhythm. Heart sounds: Normal heart sounds. Pulmonary:      Effort: Pulmonary effort is normal.      Breath sounds: No wheezing. Abdominal:      General: Bowel sounds are normal.      Palpations: Abdomen is soft. Tenderness: There is no abdominal tenderness. Musculoskeletal:         General: No swelling or tenderness. Cervical back: Normal range of motion. Skin:     General: Skin is warm and dry. Neurological:      General: No focal deficit present.       Mental Status: He is alert and oriented to person, place, and time. Cranial Nerves: No cranial nerve deficit. Motor: No weakness (Strength symmetric no deficits). Psychiatric:         Mood and Affect: Mood normal.       Lab Results: I have personally reviewed pertinent reports. Results from last 7 days   Lab Units 11/07/23  1104   WBC Thousand/uL 7.49   HEMOGLOBIN g/dL 16.2   HEMATOCRIT % 48.2   PLATELETS Thousands/uL 229     Results from last 7 days   Lab Units 11/07/23  1103   SODIUM mmol/L 136   POTASSIUM mmol/L 5.1   CHLORIDE mmol/L 102   CO2 mmol/L 27   ANION GAP mmol/L 7   BUN mg/dL 14   CREATININE mg/dL 0.99   CALCIUM mg/dL 9.3   EGFR ml/min/1.73sq m 82   GLUCOSE RANDOM mg/dL 200*     Results from last 7 days   Lab Units 11/07/23  1103   INR  0.93         Results from last 7 days   Lab Units 11/07/23  1103   HS TNI 0HR ng/L 2                        Invalid input(s): "Melony Lyons"        Results from last 7 days   Lab Units 11/07/23  1104   SARS-COV-2  Negative   INFLUENZA A PCR  Negative   INFLUENZA B PCR  Negative   RSV PCR  Negative       Lines/Drains  Invasive Devices       Peripheral Intravenous Line  Duration             Peripheral IV 11/07/23 Right Antecubital <1 day                    Imaging: I have personally reviewed pertinent films in PACS  MRI brain wo contrast    Result Date: 11/7/2023  Impression: No acute intracranial pathology. Mild chronic microangiopathy. Workstation performed: [id number]     CT stroke alert brain    Result Date: 11/7/2023  Impression: No acute intracranial abnormality. Findings were communicated with Dr. Naldo Pillai  at 11:42 a.m. Workstation performed: [id number]     CTA stroke alert (head/neck)    Result Date: 11/7/2023  Impression: 1. CTA head: Negative for large vessel intracranial occlusion . Areas of mild stenosis involving the bilateral cavernous and supraclinoid ICA segments. 2. CTA neck:  No extracranial carotid stenosis.   The cervical vertebral arteries are patent, with high-grade origin stenosis on the right. Findings were communicated with Dr. Jericho Irizarry  at 11:42 a.m. Workstation performed: [id number]       EKG, Pathology, and Other Studies Reviewed on Admission:   EKG  Result Date: 11/07/23  Personally reviewed strips with impression of: Normal sinus rhythm 89 bpm    ** Please Note: This note has been constructed using a voice recognition system.  **

## 2023-11-07 NOTE — ED PROVIDER NOTES
History  Chief Complaint   Patient presents with    Dizziness     Patient started with dizziness this morning. Patient had an episode of left hand numbness on Sunday which resolved. Patient having headaches for months. History provided by:  Medical records and patient  Dizziness  Quality:  Vertigo  Severity:  Mild  Onset quality:  Gradual  Duration:  2 months  Timing:  Intermittent  Progression:  Waxing and waning  Chronicity:  Chronic  Context: bending over and head movement    Relieved by:  Lying down  Worsened by:  Turning head and movement  Ineffective treatments:  None tried  Associated symptoms: chest pain, headaches, shortness of breath and tinnitus    Associated symptoms: no blood in stool, no diarrhea, no hearing loss, no nausea, no palpitations, no syncope, no vision changes, no vomiting and no weakness        Prior to Admission Medications   Prescriptions Last Dose Informant Patient Reported? Taking?    Aspirin 81 MG CAPS 11/6/2023 Self Yes Yes   Sig: Take by mouth   Continuous Blood Gluc Sensor (FreeStyle Tyrell 3 Sensor) MISC  Self Yes No   Sig: Use as directed   Jardiance 25 MG TABS 11/6/2023 Self Yes Yes   Sig: Take 10 mg by mouth every morning   amLODIPine (NORVASC) 5 mg tablet 11/6/2023 Self Yes Yes   Sig: Take 10 mg by mouth daily   ergocalciferol (VITAMIN D2) 50,000 units Past Week Self Yes Yes   Sig: TAKE 1 CAPSULE BY MOUTH TWICE MONTHLY   glimepiride (AMARYL) 4 mg tablet Not Taking Self Yes No   Patient not taking: Reported on 10/11/2023   loratadine (Claritin) 10 mg tablet Past Month Self Yes Yes   Sig: Take 10 mg by mouth if needed for allergies   losartan (COZAAR) 50 mg tablet 11/6/2023 Self Yes Yes   Sig: Take 100 mg by mouth daily   meloxicam (Mobic) 15 mg tablet 11/6/2023  No Yes   Sig: Take 1 tablet (15 mg total) by mouth daily   metFORMIN (GLUCOPHAGE) 500 mg tablet 11/6/2023 Self Yes Yes   Sig: Take 500 mg by mouth 2 (two) times a day with meals   oxybutynin (DITROPAN-XL) 10 MG 24 hr tablet 11/6/2023 Self No Yes   Sig: Take 1 tablet (10 mg total) by mouth daily at bedtime   rosuvastatin (CRESTOR) 10 MG tablet 11/6/2023 Self Yes Yes   Sig: Take 10 mg by mouth daily   semaglutide, 0.25 or 0.5 mg/dose, (Ozempic, 0.25 or 0.5 MG/DOSE,) 2 mg/3 mL injection pen Past Week Self Yes Yes   Sig: Inject under the skin every 7 days   sildenafil (REVATIO) 20 mg tablet Not Taking Self No No   Sig: Take anywhere from 1 to 5 tablets but no more than 5 tablets in a 24-hour period. Do not drink alcohol with this product and take on empty stomach or wait 2 hours after eating to take the medication. Patient not taking: Reported on 10/11/2023   sildenafil (VIAGRA) 100 mg tablet Not Taking Self Yes No   Sig: PLEASE SEE ATTACHED FOR DETAILED DIRECTIONS   Patient not taking: Reported on 10/11/2023   tadalafil (CIALIS) 20 MG tablet Past Week  No Yes   Sig: Take 1 tablet (20 mg total) by mouth daily as needed for erectile dysfunction      Facility-Administered Medications: None       Past Medical History:   Diagnosis Date    Dyslipidemia     Hypertension     Type 2 diabetes mellitus ([address])        Past Surgical History:   Procedure Laterality Date    ANKLE SURGERY Left     COLONOSCOPY         History reviewed. No pertinent family history. I have reviewed and agree with the history as documented. E-Cigarette/Vaping    E-Cigarette Use Never User      E-Cigarette/Vaping Substances    Nicotine No     THC No     CBD No     Flavoring No     Other No     Unknown No      Social History     Tobacco Use    Smoking status: Never    Smokeless tobacco: Never   Vaping Use    Vaping Use: Never used   Substance Use Topics    Alcohol use: Not Currently    Drug use: Not Currently       Review of Systems   Constitutional:  Positive for fatigue. Negative for appetite change, chills and fever. HENT:  Positive for tinnitus. Negative for ear pain, hearing loss, rhinorrhea, sore throat and trouble swallowing.     Eyes:  Negative for pain, discharge and visual disturbance. Respiratory:  Positive for shortness of breath. Negative for cough and chest tightness. Cardiovascular:  Positive for chest pain. Negative for palpitations and syncope. Gastrointestinal:  Negative for abdominal pain, blood in stool, diarrhea, nausea and vomiting. Endocrine: Negative for polydipsia, polyphagia and polyuria. Genitourinary:  Negative for difficulty urinating, dysuria, hematuria and testicular pain. Musculoskeletal:  Negative for arthralgias and back pain. Skin:  Negative for color change and rash. Allergic/Immunologic: Negative for immunocompromised state. Neurological:  Positive for dizziness, numbness and headaches. Negative for seizures, syncope, facial asymmetry, speech difficulty and weakness. Hematological:  Negative for adenopathy. Psychiatric/Behavioral:  Negative for confusion and dysphoric mood. All other systems reviewed and are negative. Physical Exam  Physical Exam  Vitals and nursing note reviewed. Constitutional:       General: He is not in acute distress. Appearance: Normal appearance. He is not ill-appearing, toxic-appearing or diaphoretic. HENT:      Head: Normocephalic and atraumatic. Right Ear: Tympanic membrane, ear canal and external ear normal. There is no impacted cerumen. Left Ear: Tympanic membrane, ear canal and external ear normal. There is no impacted cerumen. Nose: Nose normal. No congestion or rhinorrhea. Mouth/Throat:      Mouth: Mucous membranes are moist.      Pharynx: Oropharynx is clear. No oropharyngeal exudate or posterior oropharyngeal erythema. Eyes:      General:         Right eye: No discharge. Left eye: No discharge. Extraocular Movements: Extraocular movements intact. Pupils: Pupils are equal, round, and reactive to light. Cardiovascular:      Rate and Rhythm: Normal rate and regular rhythm. Pulses: Normal pulses.       Heart sounds: Normal heart sounds. No murmur heard. No gallop. Pulmonary:      Effort: Pulmonary effort is normal. No respiratory distress. Breath sounds: Normal breath sounds. No stridor. No wheezing, rhonchi or rales. Chest:      Chest wall: No tenderness. Abdominal:      General: Bowel sounds are normal. There is no distension. Palpations: Abdomen is soft. There is no mass. Tenderness: There is no abdominal tenderness. There is no right CVA tenderness, left CVA tenderness, guarding or rebound. Hernia: No hernia is present. Musculoskeletal:         General: Normal range of motion. Cervical back: Normal range of motion and neck supple. Skin:     General: Skin is warm and dry. Capillary Refill: Capillary refill takes less than 2 seconds. Neurological:      General: No focal deficit present. Mental Status: He is alert and oriented to person, place, and time. Cranial Nerves: No cranial nerve deficit. Sensory: No sensory deficit. Motor: No weakness. Coordination: Coordination normal.      Gait: Gait normal.      Deep Tendon Reflexes: Reflexes normal.   Psychiatric:         Mood and Affect: Mood normal.         Behavior: Behavior normal.         Thought Content:  Thought content normal.         Judgment: Judgment normal.         Vital Signs  ED Triage Vitals   Temperature Pulse Respirations Blood Pressure SpO2   11/07/23 1035 11/07/23 1035 11/07/23 1035 11/07/23 1035 11/07/23 1035   (!) 97.3 °F (36.3 °C) 82 18 160/85 97 %      Temp Source Heart Rate Source Patient Position - Orthostatic VS BP Location FiO2 (%)   11/07/23 1035 11/07/23 1035 11/07/23 1035 11/07/23 1035 --   Temporal Monitor Lying Right arm       Pain Score       11/07/23 1200       No Pain           Vitals:    11/07/23 1342 11/07/23 1415 11/07/23 1427 11/07/23 1431   BP: 135/77  162/78 162/78   Pulse: 71 86 88 89   Patient Position - Orthostatic VS:             Visual Acuity  Visual Acuity Flowsheet Row Most Recent Value   L Pupil Size (mm) 2   R Pupil Size (mm) 2            ED Medications  Medications   insulin lispro (HumaLOG) 100 units/mL subcutaneous injection 1-6 Units (has no administration in time range)   atorvastatin (LIPITOR) tablet 40 mg (has no administration in time range)   aspirin chewable tablet 81 mg (has no administration in time range)   heparin (porcine) subcutaneous injection 5,000 Units (has no administration in time range)   iohexol (OMNIPAQUE) 350 MG/ML injection (MULTI-DOSE) 85 mL (85 mL Intravenous Given 11/7/23 1057)   aspirin chewable tablet 324 mg (324 mg Oral Given 11/7/23 1248)       Diagnostic Studies  Results Reviewed       Procedure Component Value Units Date/Time    C-reactive protein [[id number]]  (Normal) Collected: 11/07/23 1103    Lab Status: Final result Specimen: Blood from Arm, Right Updated: 11/07/23 1301     CRP 1.6 mg/L     Homocysteine, serum [[id number]] Collected: 11/07/23 1103    Lab Status: In process Specimen: Blood from Arm, Right Updated: 11/07/23 1236    FLU/RSV/COVID - if FLU/RSV clinically relevant [[id number]]  (Normal) Collected: 11/07/23 1104    Lab Status: Final result Specimen: Nares from Nose Updated: 11/07/23 1150     SARS-CoV-2 Negative     INFLUENZA A PCR Negative     INFLUENZA B PCR Negative     RSV PCR Negative    Narrative:      FOR PEDIATRIC PATIENTS - copy/paste COVID Guidelines URL to browser: [email or web address]    SARS-CoV-2 assay is a Nucleic Acid Amplification assay intended for the  qualitative detection of nucleic acid from SARS-CoV-2 in nasopharyngeal  swabs. Results are for the presumptive identification of SARS-CoV-2 RNA. Positive results are indicative of infection with SARS-CoV-2, the virus  causing COVID-19, but do not rule out bacterial infection or co-infection  with other viruses.  Laboratories within the [facility] and its  territories are required to report all positive results to the appropriate  public health authorities. Negative results do not preclude SARS-CoV-2  infection and should not be used as the sole basis for treatment or other  patient management decisions. Negative results must be combined with  clinical observations, patient history, and epidemiological information. This test has not been FDA cleared or approved. This test has been authorized by FDA under an Emergency Use Authorization  (EUA). This test is only authorized for the duration of time the  declaration that circumstances exist justifying the authorization of the  emergency use of an in vitro diagnostic tests for detection of SARS-CoV-2  virus and/or diagnosis of COVID-19 infection under section 564(b)(1) of  the Act, 21 U. S.C. 201FXC-1(B)(6), unless the authorization is terminated  or revoked sooner. The test has been validated but independent review by FDA  and CLIA is pending. Test performed using [employer] Gene[employer]pert: This RT-PCR assay targets N2,  a region unique to SARS-CoV-2. A conserved region in the E-gene was chosen  for pan-Sarbecovirus detection which includes SARS-CoV-2. According to CMS-2020-01-R, this platform meets the definition of high-throughput technology.     Sana Amy [[id number]]  (Normal) Collected: 11/07/23 1103    Lab Status: Final result Specimen: Blood from Arm, Right Updated: 11/07/23 1132     Protime 12.8 seconds      INR 0.93    APTT [[id number]]  (Normal) Collected: 11/07/23 1103    Lab Status: Final result Specimen: Blood from Arm, Right Updated: 11/07/23 1132     PTT 28 seconds     HS Troponin 0hr (reflex protocol) [[id number]]  (Normal) Collected: 11/07/23 1103    Lab Status: Final result Specimen: Blood from Arm, Right Updated: 11/07/23 1132     hs TnI 0hr 2 ng/L     Basic metabolic panel [[id number]]  (Abnormal) Collected: 11/07/23 1103    Lab Status: Final result Specimen: Blood from Arm, Right Updated: 11/07/23 1131     Sodium 136 mmol/L Potassium 5.1 mmol/L      Chloride 102 mmol/L      CO2 27 mmol/L      ANION GAP 7 mmol/L      BUN 14 mg/dL      Creatinine 0.99 mg/dL      Glucose 200 mg/dL      Calcium 9.3 mg/dL      eGFR 82 ml/min/1.73sq m     Narrative:      [facility] guidelines for Chronic Kidney Disease (CKD):     Stage 1 with normal or high GFR (GFR > 90 mL/min/1.73 square meters)    Stage 2 Mild CKD (GFR = 60-89 mL/min/1.73 square meters)    Stage 3A Moderate CKD (GFR = 45-59 mL/min/1.73 square meters)    Stage 3B Moderate CKD (GFR = 30-44 mL/min/1.73 square meters)    Stage 4 Severe CKD (GFR = 15-29 mL/min/1.73 square meters)    Stage 5 End Stage CKD (GFR <15 mL/min/1.73 square meters)  Note: GFR calculation is accurate only with a steady state creatinine    CBC and Platelet [[id number]]  (Normal) Collected: 11/07/23 1104    Lab Status: Final result Specimen: Blood from Arm, Right Updated: 11/07/23 1111     WBC 7.49 Thousand/uL      RBC 5.33 Million/uL      Hemoglobin 16.2 g/dL      Hematocrit 48.2 %      MCV 90 fL      MCH 30.4 pg      MCHC 33.6 g/dL      RDW 13.4 %      Platelets 040 Thousands/uL      MPV 9.4 fL     Fingerstick Glucose (POCT) [[id number]]  (Abnormal) Collected: 11/07/23 1044    Lab Status: Final result Updated: 11/07/23 1106     POC Glucose 189 mg/dl                    CT stroke alert brain   Final Result by America Adamson MD (11/07 1144)      No acute intracranial abnormality. Findings were communicated with Dr. Sana Stephen  at 11:42 a.m. Workstation performed: [id number]         CTA stroke alert (head/neck)   Final Result by America Adamson MD (11/07 1144)   1. CTA head: Negative for large vessel intracranial occlusion . Areas of mild stenosis involving the bilateral cavernous and supraclinoid ICA segments. 2. CTA neck:  No extracranial carotid stenosis. The cervical vertebral arteries are patent, with high-grade origin stenosis on the right.       Findings were communicated with Dr. Kaylie Akins  at 11:42 a.m. Workstation performed: [id number]         MRI inpatient order    (Results Pending)   XR chest portable    (Results Pending)              Procedures  Procedures         ED Course                               SBIRT 20yo+      Flowsheet Row Most Recent Value   Initial Alcohol Screen: US AUDIT-C     1. How often do you have a drink containing alcohol? 0 Filed at: 11/07/2023 1116   2. How many drinks containing alcohol do you have on a typical day you are drinking? 0 Filed at: 11/07/2023 1116   3a. Male UNDER 65: How often do you have five or more drinks on one occasion? 0 Filed at: 11/07/2023 1116   3b. FEMALE Any Age, or MALE 65+: How often do you have 4 or more drinks on one occassion? 0 Filed at: 11/07/2023 1116   Audit-C Score 0 Filed at: 11/07/2023 1116   VIANEY: How many times in the past year have you. .. Used an illegal drug or used a prescription medication for non-medical reasons? Never Filed at: 11/07/2023 [address]  8406: Patient appears well, vital signs reviewed. He was a stroke alert after triage evaluation. Patient has been having intermittent symptoms of vertigo along with paresthesias of the left arm and right arm. Patient is fairly asymptomatic at this time. Patient has other nonspecific complaints of chest pain, headache, sinus pressure all happening over the past 2 months. After discussion with neurologist, due to NIH SS 0, no neurological deficit, stuttering symptoms and unclear time of onset, stroke alert was canceled however plan to complete neuroimaging due to his other complaints. Plan to complete basic labs including cardiac enzymes. Placed on a monitor. Plan to complete stat CTA head and neck. Discussed with Dr. Shailesh White neurology. 1209: CTAs and labs reviewed with neurologist.  Recommendations for observational admission. ASA ordered.     Amount and/or Complexity of Data Reviewed  Labs: ordered. Radiology: ordered and independent interpretation performed. Details: CTA head no acute pathology  CTA neck vertebral artery stenosis  ECG/medicine tests: ordered and independent interpretation performed. Details: Normal sinus rhythm 89 bpm, no acute ischemia. Risk  OTC drugs. Prescription drug management. Decision regarding hospitalization.              Disposition  Final diagnoses:   Dizziness   Vertebral artery disease (HCC)   Chest pain     Time reflects when diagnosis was documented in both MDM as applicable and the Disposition within this note       Time User Action Codes Description Comment    11/7/2023 10:48 AM Alexander Dominguez [R42] Dizziness     11/7/2023 12:14 PM Aleaxnder Casiano Add [I77.9] Vertebral artery disease ([address])     11/7/2023 12:17 PM Alexander Dominguez [R07.9] Chest pain           ED Disposition       ED Disposition   Admit    Condition   Stable    Date/Time   Tue Nov 7, 2023 1213    Comment                  Follow-up Information    None         Current Discharge Medication List        CONTINUE these medications which have NOT CHANGED    Details   amLODIPine (NORVASC) 5 mg tablet Take 10 mg by mouth daily      Aspirin 81 MG CAPS Take by mouth      ergocalciferol (VITAMIN D2) 50,000 units TAKE 1 CAPSULE BY MOUTH TWICE MONTHLY      Jardiance 25 MG TABS Take 10 mg by mouth every morning      loratadine (Claritin) 10 mg tablet Take 10 mg by mouth if needed for allergies      losartan (COZAAR) 50 mg tablet Take 100 mg by mouth daily      meloxicam (Mobic) 15 mg tablet Take 1 tablet (15 mg total) by mouth daily  Qty: 14 tablet, Refills: 0    Associated Diagnoses: Suprapubic pressure      metFORMIN (GLUCOPHAGE) 500 mg tablet Take 500 mg by mouth 2 (two) times a day with meals      oxybutynin (DITROPAN-XL) 10 MG 24 hr tablet Take 1 tablet (10 mg total) by mouth daily at bedtime  Qty: 90 tablet, Refills: 3    Associated Diagnoses: Urinary urgency      rosuvastatin (CRESTOR) 10 MG tablet Take 10 mg by mouth daily      semaglutide, 0.25 or 0.5 mg/dose, (Ozempic, 0.25 or 0.5 MG/DOSE,) 2 mg/3 mL injection pen Inject under the skin every 7 days      tadalafil (CIALIS) 20 MG tablet Take 1 tablet (20 mg total) by mouth daily as needed for erectile dysfunction  Qty: 30 tablet, Refills: 3    Associated Diagnoses: Erectile dysfunction, unspecified erectile dysfunction type      Continuous Blood Gluc Sensor (LocallyStyle Tyrell 3 Sensor) MISC Use as directed      glimepiride (AMARYL) 4 mg tablet       sildenafil (REVATIO) 20 mg tablet Take anywhere from 1 to 5 tablets but no more than 5 tablets in a 24-hour period. Do not drink alcohol with this product and take on empty stomach or wait 2 hours after eating to take the medication. Qty: 90 tablet, Refills: 3    Associated Diagnoses: Erectile dysfunction, unspecified erectile dysfunction type      sildenafil (VIAGRA) 100 mg tablet PLEASE SEE ATTACHED FOR DETAILED DIRECTIONS             No discharge procedures on file.     PDMP Review       None            ED Provider  Electronically Signed by             Lc Norwood MD  11/07/23 2240

## 2023-11-07 NOTE — ASSESSMENT & PLAN NOTE
History of diabetes hypertension and hyperlipidemia who presents with intermittent headache/neck pain as well as upper extremity paresthesias  MRI of brain negative for CVA. Discussed with neurology, does not need stroke pathway. His symptoms of upper extremity paresthesias may be radicular. Will check MRI of cervical spine. Dizziness may be vertigo versus positional.  Given use of ED medications, will check orthostatic vitals to rule out orthostatic hypotension  Neurology recommends placement on telemetry to rule out dysrhythmias which may be causing symptoms.

## 2023-11-07 NOTE — ED NOTES
Stroke alert canceled at this time per Dr. Ankur Gerber after discussing case with neurology.       Cirilo Vincent RN  11/07/23 6483

## 2023-11-07 NOTE — ASSESSMENT & PLAN NOTE
Stages of both upper extremities. Patient is a  for over 40 years and also rides his [address]. He notices his symptoms of paresthesias are worse when he rides his motorcycle  May have carpal tunnel syndrome.     Given presence of neck pain will check MRI of cervical spine to rule out radiculopathy  Discussed with neurology who also recommends checking B12

## 2023-11-07 NOTE — ASSESSMENT & PLAN NOTE
Lab Results   Component Value Date    HGBA1C 7.1 (H) 08/23/2023     Recent Labs     11/07/23  1044 11/07/23  1527   POCGLU 189* 168*     Follows with [facility] endocrinology. Recently glimepiride discontinued. Prior to admission on jardiance semaglutide and metformin. All will be held in favor of sliding scale insulin during hospitalization.

## 2023-11-07 NOTE — ASSESSMENT & PLAN NOTE
Prior to admission was taking tadalafil which may be causing headache/hypotension. Will check orthostatic vitals.

## 2023-11-07 NOTE — ASSESSMENT & PLAN NOTE
Given lack of evidence for ischemic stroke, does not need permissive hypertension. Continue amlodipine and losartan as previously taken.

## 2023-11-07 NOTE — CASE MANAGEMENT
Case Management Assessment & Discharge Planning Note    Patient name Tanner Holley  Location /-78 MRN [id number]  : 1965 Date 2023       Current Admission Date: 2023  Current Admission Diagnosis:Dizziness, Chest pain, Vertebral artery disease [facility])   Patient Active Problem List    Diagnosis Date Noted    Type 2 diabetes mellitus with hemoglobin A1c goal of less than 7.0% ([address]) 10/11/2023    Erectile dysfunction 2023    Dysuria 2023    Urinary urgency 2023    Nocturia 2023      LOS (days): 0  Geometric Mean LOS (GMLOS) (days):   Days to GMLOS:     OBJECTIVE:           Current admission status: Inpatient  Referral Reason: Other (discharge planning)    Preferred Pharmacy:   [id number]  [address] [facility]  [address] [place] [address] [place] [address]  Phone: [phone] Fax: [phone]    Primary Care Provider: Shantanu Delgado    Primary Insurance: BLUE CROSS  Secondary Insurance:     ASSESSMENT:  [facility] Proxies    There are no active Health Care Proxies on file.        Advance Directives  Does patient have a Health Care POA?: Yes (Dtr Deandre )  Does patient have Advance Directives?: Yes  Advance Directives: Living will, Power of  for health care  Primary Contact: Simeon Cuevas (Daughter) [phone] (Mobile)      Readmission Root Cause  30 Day Readmission: No    Patient Information  Admitted from[de-identified] Home  Mental Status: Alert  During Assessment patient was accompanied by: Not accompanied during assessment  Assessment information provided by[de-identified] Patient  Primary Caregiver: Self  Support Systems: [address] of Residence: [address] [place] [address] do you live in?: [address] [place] [address] Road  In the last 12 months, was there a time when you were not able to pay the mortgage or rent on time?: No  In the last 12 months, how many places have you lived?: 1  In the last 12 months, was there a time when you did not have a steady place to sleep or slept in a shelter (including now)?: No  Homeless/housing insecurity resource given?: N/A  Living Arrangements: Lives Alone  Is patient a ?: No    Activities of Daily Living Prior to Admission  Functional Status: Independent  Completes ADLs independently?: Yes  Ambulates independently?: Yes  Does patient use assisted devices?: No  Does patient currently own DME?: No  Does patient have a history of Outpatient Therapy (PT/OT)?: Yes  Does the patient have a history of Short-Term Rehab?: No  Does patient have a history of HHC?: No  Does patient currently have [address]?: No      Patient Information Continued  Income Source: Employed  Does patient have prescription coverage?: Yes  Within the past 12 months, you worried that your food would run out before you got the money to buy more.: Never true  Within the past 12 months, the food you bought just didn't last and you didn't have money to get more.: Never true  Food insecurity resource given?: N/A  Does patient receive dialysis treatments?: No  Does patient have a history of substance abuse?: No  Does patient have a history of Mental Health Diagnosis?: No         Means of Transportation  Means of Transport to Appts[de-identified] Drives Self  In the past 12 months, has lack of transportation kept you from medical appointments or from getting medications?: No  In the past 12 months, has lack of transportation kept you from meetings, work, or from getting things needed for daily living?: No    DISCHARGE DETAILS:    Discharge planning discussed with[de-identified] Patient  Freedom of Choice: Yes     CM contacted family/caregiver?: No- see comments (AAOx3)      Additional Comments: Call to patient and introduced CM department and role. CM assessment completed. Patient IPTA and works FT.   CM department following thru discharge

## 2023-11-07 NOTE — CONSULTS
e-Consult (IPC)   Jef Hurst 59 y.o. male MRN: [id number]  Unit/Bed#: -01 Encounter: [id number]      Reason for Consult / Principal Problem: dizziness intermittent x 1 month with no deficits at the time of stroke alert earlier today and patient independently ambulatory thus cancelled alert earlier today  Inpatient consult to Neurology  Consult performed by: Jasmeet Gonzalez DO  Consult ordered by: Ptaricia Vallejo MD        11/07/23      ASSESSMENT:  Mr. Janet Foley is a 60 yo male HTN, DM, HLD, presenting to the hospital with several months of intermittent dizziness and per ED report alternating dysesthesias subjective greater than objective, occasional chest pain. Patient with no focal deficits on ED exam earlier today and was independently ambulatory. Thus initial stroke alert cancelled. CT H CTA H/N with no significant abnormality noted. No acute changes no severe stenosis or LVO noted  MRI brain routine completed with no acute or significant chronic pathology noted    At this time suspect less likely neurologic etiology of patients various symptoms. RECOMMENDATIONS:  Recommend telemetry to make sure no arrythmias causing intermittent dizziness, chest pain. Consider ECHO. Further cardiac/medical work up per primary team  Recommend check TSH T4 vitamin B12  No further IP neurodiagnostics at this time    11-20 minutes, >50% of the total time devoted to medical consultative verbal/EMR discussion between providers. Written report will be generated in the EMR.     Jasmeet Gonzalez DO

## 2023-11-07 NOTE — PLAN OF CARE
Problem: MOBILITY - ADULT  Goal: Maintain or return to baseline ADL function  Description: INTERVENTIONS:  -  Assess patient's ability to carry out ADLs; assess patient's baseline for ADL function and identify physical deficits which impact ability to perform ADLs (bathing, care of mouth/teeth, toileting, grooming, dressing, etc.)  - Assess/evaluate cause of self-care deficits   - Assess range of motion  - Assess patient's mobility; develop plan if impaired  - Assess patient's need for assistive devices and provide as appropriate  - Encourage maximum independence but intervene and supervise when necessary  - Involve family in performance of ADLs  - Assess for home care needs following discharge   - Consider OT consult to assist with ADL evaluation and planning for discharge  - Provide patient education as appropriate  Outcome: Progressing  Goal: Maintains/Returns to pre admission functional level  Description: INTERVENTIONS:  - Perform BMAT or MOVE assessment daily.   - Set and communicate daily mobility goal to care team and patient/family/caregiver.    - Collaborate with rehabilitation services on mobility goals if consulted  - Out of bed for toileting  - Record patient progress and toleration of activity level   Outcome: Progressing     Problem: SAFETY ADULT  Goal: Maintain or return to baseline ADL function  Description: INTERVENTIONS:  -  Assess patient's ability to carry out ADLs; assess patient's baseline for ADL function and identify physical deficits which impact ability to perform ADLs (bathing, care of mouth/teeth, toileting, grooming, dressing, etc.)  - Assess/evaluate cause of self-care deficits   - Assess range of motion  - Assess patient's mobility; develop plan if impaired  - Assess patient's need for assistive devices and provide as appropriate  - Encourage maximum independence but intervene and supervise when necessary  - Involve family in performance of ADLs  - Assess for home care needs following discharge   - Consider OT consult to assist with ADL evaluation and planning for discharge  - Provide patient education as appropriate  Outcome: Progressing  Goal: Maintains/Returns to pre admission functional level  Description: INTERVENTIONS:  - Perform BMAT or MOVE assessment daily.   - Set and communicate daily mobility goal to care team and patient/family/caregiver.    - Collaborate with rehabilitation services on mobility goals if consulted  - Out of bed for toileting  - Record patient progress and toleration of activity level   Outcome: Progressing  Goal: Patient will remain free of falls  Description: INTERVENTIONS:  -  Assess patient's ability to carry out ADLs; assess patient's baseline for ADL function and identify physical deficits which impact ability to perform ADLs (bathing, care of mouth/teeth, toileting, grooming, dressing, etc.)  - Assess/evaluate cause of self-care deficits   - Assess range of motion  - Assess patient's mobility; develop plan if impaired  - Assess patient's need for assistive devices and provide as appropriate  - Encourage maximum independence but intervene and supervise when necessary  - Involve family in performance of ADLs  - Assess for home care needs following discharge   - Consider OT consult to assist with ADL evaluation and planning for discharge  - Provide patient education as appropriate  Outcome: Progressing

## 2023-11-08 VITALS
WEIGHT: 233.69 LBS | TEMPERATURE: 97.7 F | RESPIRATION RATE: 18 BRPM | SYSTOLIC BLOOD PRESSURE: 131 MMHG | DIASTOLIC BLOOD PRESSURE: 80 MMHG | BODY MASS INDEX: 35.42 KG/M2 | HEART RATE: 79 BPM | OXYGEN SATURATION: 94 % | HEIGHT: 68 IN

## 2023-11-08 PROBLEM — J01.80 OTHER ACUTE SINUSITIS: Status: ACTIVE | Noted: 2023-11-08

## 2023-11-08 LAB
ALBUMIN SERPL BCP-MCNC: 4.2 G/DL (ref 3.5–5)
ALP SERPL-CCNC: 46 U/L (ref 34–104)
ALT SERPL W P-5'-P-CCNC: 16 U/L (ref 7–52)
ANION GAP SERPL CALCULATED.3IONS-SCNC: 8 MMOL/L
AORTIC ROOT: 3.3 CM
AORTIC VALVE MEAN VELOCITY: 6.8 M/S
APICAL FOUR CHAMBER EJECTION FRACTION: 66 %
ASCENDING AORTA: 3 CM
AST SERPL W P-5'-P-CCNC: 14 U/L (ref 13–39)
AV AREA BY CONTINUOUS VTI: 4.2 CM2
AV AREA PEAK VELOCITY: 4.2 CM2
AV LVOT MEAN GRADIENT: 2 MMHG
AV LVOT PEAK GRADIENT: 5 MMHG
AV MEAN GRADIENT: 2 MMHG
AV PEAK GRADIENT: 4 MMHG
AV VALVE AREA: 4.16 CM2
AV VELOCITY RATIO: 1.1
BILIRUB SERPL-MCNC: 0.61 MG/DL (ref 0.2–1)
BUN SERPL-MCNC: 16 MG/DL (ref 5–25)
CALCIUM SERPL-MCNC: 8.9 MG/DL (ref 8.4–10.2)
CHLORIDE SERPL-SCNC: 104 MMOL/L (ref 96–108)
CHOLEST SERPL-MCNC: 199 MG/DL
CO2 SERPL-SCNC: 24 MMOL/L (ref 21–32)
CREAT SERPL-MCNC: 0.81 MG/DL (ref 0.6–1.3)
DOP CALC AO PEAK VEL: 1.01 M/S
DOP CALC AO VTI: 20.2 CM
DOP CALC LVOT AREA: 3.8 CM2
DOP CALC LVOT CARDIAC INDEX: 2.81 L/MIN/M2
DOP CALC LVOT CARDIAC OUTPUT: 6.13 L/MIN
DOP CALC LVOT DIAMETER: 2.2 CM
DOP CALC LVOT PEAK VEL VTI: 22.11 CM
DOP CALC LVOT PEAK VEL: 1.11 M/S
DOP CALC LVOT STROKE INDEX: 39 ML/M2
DOP CALC LVOT STROKE VOLUME: 84
E WAVE DECELERATION TIME: 126 MS
E/A RATIO: 1.22
ERYTHROCYTE [DISTWIDTH] IN BLOOD BY AUTOMATED COUNT: 13.3 % (ref 11.6–15.1)
EST. AVERAGE GLUCOSE BLD GHB EST-MCNC: 157 MG/DL
FRACTIONAL SHORTENING: 38 (ref 28–44)
GFR SERPL CREATININE-BSD FRML MDRD: 96 ML/MIN/1.73SQ M
GLUCOSE SERPL-MCNC: 138 MG/DL (ref 65–140)
GLUCOSE SERPL-MCNC: 144 MG/DL (ref 65–140)
GLUCOSE SERPL-MCNC: 154 MG/DL (ref 65–140)
HBA1C MFR BLD: 7.1 %
HCT VFR BLD AUTO: 47.5 % (ref 36.5–49.3)
HDLC SERPL-MCNC: 35 MG/DL
HGB BLD-MCNC: 15.9 G/DL (ref 12–17)
INTERVENTRICULAR SEPTUM IN DIASTOLE (PARASTERNAL SHORT AXIS VIEW): 1.4 CM
INTERVENTRICULAR SEPTUM: 1.4 CM (ref 0.6–1.1)
LAAS-AP2: 11.7 CM2
LAAS-AP4: 20 CM2
LDLC SERPL CALC-MCNC: 111 MG/DL (ref 0–100)
LEFT ATRIUM SIZE: 3.5 CM
LEFT ATRIUM VOLUME (MOD BIPLANE): 39 ML
LEFT ATRIUM VOLUME INDEX (MOD BIPLANE): 17.9 ML/M2
LEFT INTERNAL DIMENSION IN SYSTOLE: 2.6 CM (ref 2.1–4)
LEFT VENTRICLE DIASTOLIC VOLUME (MOD BIPLANE): 85 ML
LEFT VENTRICLE SYSTOLIC VOLUME (MOD BIPLANE): 24 ML
LEFT VENTRICULAR INTERNAL DIMENSION IN DIASTOLE: 4.2 CM (ref 3.5–6)
LEFT VENTRICULAR POSTERIOR WALL IN END DIASTOLE: 0.9 CM
LEFT VENTRICULAR STROKE VOLUME: 53 ML
LV EF: 72 %
LVSV (TEICH): 53 ML
MCH RBC QN AUTO: 29.9 PG (ref 26.8–34.3)
MCHC RBC AUTO-ENTMCNC: 33.5 G/DL (ref 31.4–37.4)
MCV RBC AUTO: 89 FL (ref 82–98)
MV E'TISSUE VEL-LAT: 8 CM/S
MV E'TISSUE VEL-SEP: 8 CM/S
MV PEAK A VEL: 0.58 M/S
MV PEAK E VEL: 71 CM/S
MV STENOSIS PRESSURE HALF TIME: 36 MS
MV VALVE AREA P 1/2 METHOD: 6.11
PLATELET # BLD AUTO: 215 THOUSANDS/UL (ref 149–390)
PMV BLD AUTO: 9.2 FL (ref 8.9–12.7)
POTASSIUM SERPL-SCNC: 4.2 MMOL/L (ref 3.5–5.3)
PROT SERPL-MCNC: 6.8 G/DL (ref 6.4–8.4)
PV PEAK GRADIENT: 6 MMHG
RBC # BLD AUTO: 5.32 MILLION/UL (ref 3.88–5.62)
RIGHT ATRIUM AREA SYSTOLE A4C: 14.8 CM2
RIGHT VENTRICLE ID DIMENSION: 3.6 CM
SL CV LEFT ATRIUM LENGTH A2C: 4.8 CM
SL CV LV EF: 65
SL CV PED ECHO LEFT VENTRICLE DIASTOLIC VOLUME (MOD BIPLANE) 2D: 78 ML
SL CV PED ECHO LEFT VENTRICLE SYSTOLIC VOLUME (MOD BIPLANE) 2D: 24 ML
SODIUM SERPL-SCNC: 136 MMOL/L (ref 135–147)
TR MAX PG: 25 MMHG
TR PEAK VELOCITY: 2.5 M/S
TRICUSPID ANNULAR PLANE SYSTOLIC EXCURSION: 2.4 CM
TRICUSPID VALVE PEAK REGURGITATION VELOCITY: 2.5 M/S
TRIGL SERPL-MCNC: 267 MG/DL
WBC # BLD AUTO: 5.54 THOUSAND/UL (ref 4.31–10.16)

## 2023-11-08 PROCEDURE — 85027 COMPLETE CBC AUTOMATED: CPT | Performed by: INTERNAL MEDICINE

## 2023-11-08 PROCEDURE — 99239 HOSP IP/OBS DSCHRG MGMT >30: CPT | Performed by: FAMILY MEDICINE

## 2023-11-08 PROCEDURE — 80061 LIPID PANEL: CPT | Performed by: INTERNAL MEDICINE

## 2023-11-08 PROCEDURE — 83036 HEMOGLOBIN GLYCOSYLATED A1C: CPT | Performed by: INTERNAL MEDICINE

## 2023-11-08 PROCEDURE — 80053 COMPREHEN METABOLIC PANEL: CPT | Performed by: INTERNAL MEDICINE

## 2023-11-08 PROCEDURE — 82948 REAGENT STRIP/BLOOD GLUCOSE: CPT

## 2023-11-08 RX ORDER — FLUTICASONE PROPIONATE 50 MCG
1 SPRAY, SUSPENSION (ML) NASAL DAILY
Qty: 9.9 ML | Refills: 0 | Status: SHIPPED | OUTPATIENT
Start: 2023-11-08

## 2023-11-08 RX ORDER — AMOXICILLIN AND CLAVULANATE POTASSIUM 875; 125 MG/1; MG/1
1 TABLET, FILM COATED ORAL EVERY 12 HOURS SCHEDULED
Qty: 14 TABLET | Refills: 0 | Status: SHIPPED | OUTPATIENT
Start: 2023-11-08 | End: 2023-11-15

## 2023-11-08 RX ADMIN — METHOCARBAMOL TABLETS 500 MG: 500 TABLET, COATED ORAL at 08:00

## 2023-11-08 RX ADMIN — ACETAMINOPHEN 650 MG: 325 TABLET, FILM COATED ORAL at 06:01

## 2023-11-08 RX ADMIN — AMLODIPINE BESYLATE 10 MG: 10 TABLET ORAL at 08:01

## 2023-11-08 RX ADMIN — INSULIN LISPRO 1 UNITS: 100 INJECTION, SOLUTION INTRAVENOUS; SUBCUTANEOUS at 08:00

## 2023-11-08 RX ADMIN — ASPIRIN 81 MG 81 MG: 81 TABLET ORAL at 08:01

## 2023-11-08 RX ADMIN — LOSARTAN POTASSIUM 100 MG: 50 TABLET, FILM COATED ORAL at 08:00

## 2023-11-08 RX ADMIN — HEPARIN SODIUM 5000 UNITS: 5000 INJECTION INTRAVENOUS; SUBCUTANEOUS at 05:59

## 2023-11-08 NOTE — DISCHARGE SUMMARY
[address] [facility]  Discharge- Keely Matta 1965, 59 y.o. male MRN: [id number]  Unit/Bed#: -Allie Encounter: [id number]  Primary Care Provider: Shade Santamaria   Date and time admitted to hospital: 11/7/2023 10:32 AM    * Dizziness and giddiness  Assessment & Plan  History of diabetes hypertension and hyperlipidemia who presents with intermittent headache/neck pain as well as upper extremity paresthesias  MRI of brain negative for CVA. Discussed with neurology, does not need stroke pathway. His symptoms of upper extremity paresthesias may be radicular. Telemetry remained uneventful, echocardiogram normal.  TSH and vitamin B12 normal.  Patient advised to follow-up with PCP for further recommendation, may consider MRI of the cervical neck. DVT recommendation appreciated. Other acute sinusitis  Assessment & Plan  Continue Augmentin, Flonase. Vertebral artery stenosis  Assessment & Plan  Question of significance. Can see neurology as an outpatient. Continue aspirin, statin. Paresthesia and pain of both upper extremities  Assessment & Plan  Stages of both upper extremities. Patient is a  for over 40 years and also rides his [employer]. He notices his symptoms of paresthesias are worse when he rides his motorcycle  May have carpal tunnel syndrome. Patient advised to follow-up with PCP for possible MRI of the neck outpatient basis. Vitamin B12, TSH normal.    Hyperlipidemia  Assessment & Plan  Continue statin. Hypertension  Assessment & Plan  Given lack of evidence for ischemic stroke, does not need permissive hypertension. Continue amlodipine and losartan as previously taken.     Type 2 diabetes mellitus with hemoglobin A1c goal of less than 7.0% [facility])  Assessment & Plan  Lab Results   Component Value Date    HGBA1C 7.1 (H) 11/08/2023     Recent Labs     11/07/23  1527 11/07/23  2054 11/08/23  0749 11/08/23  1108   POCGLU 168* 130 154* 138 Resume home medication. Erectile dysfunction  Assessment & Plan  Prior to admission was taking tadalafil which may be causing headache/hypotension. Orthostatic remained normal.      Medical Problems       Resolved Problems  Date Reviewed: 11/7/2023   None       Discharging Physician / Practitioner: Pako Gomes MD  PCP: Michelle Matute  Admission Date:   Admission Orders (From admission, onward)       Ordered        11/07/23 1224  [address]  Once                          Discharge Date: 11/08/23    Consultations During Hospital Stay:  Neurology, PT/OT    Procedures Performed:   MRI brain wo contrast   Final Result by CORINE Martin MD (11/07 1623)      No acute intracranial pathology. Mild chronic microangiopathy. Workstation performed: [id number]         XR chest portable   Final Result by Gilberto Robledo MD (11/08 9194)      No acute cardiopulmonary disease. Workstation performed: [id number]         CT stroke alert brain   Final Result by Mily Heck MD (11/07 1144)      No acute intracranial abnormality. Findings were communicated with Dr. Dany Gardner  at 11:42 a.m. Workstation performed: [id number]         CTA stroke alert (head/neck)   Final Result by Mily Heck MD (11/07 1144)   1. CTA head: Negative for large vessel intracranial occlusion . Areas of mild stenosis involving the bilateral cavernous and supraclinoid ICA segments. 2. CTA neck:  No extracranial carotid stenosis. The cervical vertebral arteries are patent, with high-grade origin stenosis on the right. Findings were communicated with Dr. Dany Gardner  at 11:42 a.m. Workstation performed: [id number]           ECHO:    Left Ventricle: Left ventricular cavity size is normal. Wall thickness is normal. The left ventricular ejection fraction is 65%.  Systolic function is normal. Wall motion is normal. Diastolic function is normal.    Tricuspid Valve: There is mild regurgitation. Significant Findings / Test Results:   Lab Results   Component Value Date    WBC 5.54 11/08/2023    HGB 15.9 11/08/2023    HCT 47.5 11/08/2023    MCV 89 11/08/2023     11/08/2023     Lab Results   Component Value Date    SODIUM 136 11/08/2023    K 4.2 11/08/2023     11/08/2023    CO2 24 11/08/2023    AGAP 8 11/08/2023    BUN 16 11/08/2023    CREATININE 0.81 11/08/2023    GLUC 144 (H) 11/08/2023    CALCIUM 8.9 11/08/2023    AST 14 11/08/2023    ALT 16 11/08/2023    ALKPHOS 46 11/08/2023    TP 6.8 11/08/2023    TBILI 0.61 11/08/2023    EGFR 96 11/08/2023     Lab Results   Component Value Date    PCT2JJTSOSCS 2.018 11/07/2023     Lab Results   Component Value Date    PPPSSPQL74 303 11/07/2023     Lab Results   Component Value Date    CHOLESTEROL 199 11/08/2023     Lab Results   Component Value Date    HDL 35 (L) 11/08/2023     Lab Results   Component Value Date    TRIG 267 (H) 11/08/2023     No results found for: "[address]"  Lab Results   Component Value Date    HGBA1C 7.1 (H) 11/08/2023         Incidental Findings:   As mentioned above, vertebral artery stenosis  I reviewed the above mentioned incidental findings with the patient and/or family and they expressed understanding. Test Results Pending at Discharge (will require follow up): None     Outpatient Tests Requested: May benefit from outpatient MRI of the cervical spine    Complications: None    Reason for Admission: Dizziness. Hospital Course:   Blanche Saunders is a 59 y.o. male patient who originally presented to the hospital on 11/7/2023 due to dizziness. Stroke alert initiated, stroke work-up negative. Patient also found vertebral artery stenosis, discussed the results with patient advised patient to follow-up with PCP and maybe neurology outpatient basis. Continue aspirin and statin.   Limited normal.  Based on clinical exam and history seems like patient was having vertigo due to chronic sinus congestion/sinusitis. Will prescribe Augmentin. Continue Flonase. PT OT evaluated the patient, cleared the patient. Condition significantly improved with current treatment. Patient is getting discharged back home with follow-up with PCP and possible neurology outpatient basis. All lab results, imaging findings, treatment plan and option discussed in details with patient and family member at the bedside. Patient may return to work on 11/9/2023. The patient, initially admitted to the hospital as inpatient, was discharged earlier than expected given the following: Condition significantly improved with current treatment. .    Please see above list of diagnoses and related plan for additional information. Condition at Discharge: stable    Discharge Day Visit / Exam:   Subjective: Seen and evaluated during the event. Resting comfortably. Denies any significant complaint. Reports his dizziness much improved. Vitals: Blood Pressure: 131/80 (11/08/23 1110)  Pulse: 79 (11/08/23 1110)  Temperature: 97.7 °F (36.5 °C) (11/08/23 1110)  Temp Source: Temporal (11/07/23 2300)  Respirations: 18 (11/07/23 2300)  Height: 5' 8" (172.7 cm) (11/07/23 1256)  Weight - Scale: 106 kg (233 lb 11 oz) (11/07/23 1256)  SpO2: 94 % (11/08/23 1110)  Exam:   Physical Exam  Vitals and nursing note reviewed. Constitutional:       Appearance: He is obese. He is not ill-appearing or diaphoretic. HENT:      Head: Normocephalic and atraumatic. Comments: Mild discomfort on tapping on maxillary and frontal sinus area. Nose: Nose normal. No congestion or rhinorrhea. Mouth/Throat:      Mouth: Mucous membranes are moist.      Pharynx: Oropharynx is clear. No oropharyngeal exudate. Eyes:      General: No scleral icterus. Left eye: No discharge. Extraocular Movements: Extraocular movements intact.       Conjunctiva/sclera: Conjunctivae normal.      Pupils: Pupils are equal, round, and reactive to light. Cardiovascular:      Rate and Rhythm: Normal rate. Heart sounds: Normal heart sounds. No murmur heard. No friction rub. No gallop. Pulmonary:      Effort: Pulmonary effort is normal. No respiratory distress. Breath sounds: No stridor. No wheezing or rhonchi. Abdominal:      General: Abdomen is flat. Bowel sounds are normal. There is no distension. Palpations: There is no mass. Tenderness: There is no abdominal tenderness. Hernia: No hernia is present. Skin:     General: Skin is warm. Capillary Refill: Capillary refill takes less than 2 seconds. Coloration: Skin is not jaundiced or pale. Findings: No bruising or erythema. Neurological:      General: No focal deficit present. Mental Status: He is alert and oriented to person, place, and time. Cranial Nerves: No cranial nerve deficit. Sensory: No sensory deficit. Motor: No weakness. Coordination: Coordination normal.   Psychiatric:         Mood and Affect: Mood normal.         Discussion with Family: Updated  (wife) at bedside. Discharge instructions/Information to patient and family:   See after visit summary for information provided to patient and family. Provisions for Follow-Up Care:  See after visit summary for information related to follow-up care and any pertinent home health orders. Disposition:   Home    Planned Readmission: If condition get worse. Discharge Statement:  Greater than 50% of the total time was spent examining patient, answering all patient questions, arranging and discussing plan of care with patient as well as directly providing post-discharge instructions. Additional time then spent on discharge activities. Discharge Medications:  See after visit summary for reconciled discharge medications provided to patient and/or family.       **Please Note: This note may have been constructed using a voice recognition system**

## 2023-11-08 NOTE — UTILIZATION REVIEW
Initial Clinical Review    Admission: Date/Time/Statement:   Admission Orders (From admission, onward)       Ordered        11/07/23 1224  INPATIENT ADMISSION  Once                          Orders Placed This Encounter   Procedures    INPATIENT ADMISSION     Standing Status:   Standing     Number of Occurrences:   1     Order Specific Question:   Level of Care     Answer:   Med Surg [16]     Order Specific Question:   Estimated length of stay     Answer:   More than 2 Midnights     Order Specific Question:   Certification     Answer:   I certify that inpatient services are medically necessary for this patient for a duration of greater than two midnights. See H&P and MD Progress Notes for additional information about the patient's course of treatment. ED Arrival Information       Expected   -    Arrival   11/7/2023 10:30    Acuity   Emergent              Means of arrival   Walk-In    Escorted by   Family Member    Service   Hospitalist    Admission type   Emergency              Arrival complaint   Dizziness             Chief Complaint   Patient presents with    Dizziness     Patient started with dizziness this morning. Patient had an episode of left hand numbness on Sunday which resolved. Patient having headaches for months. Initial Presentation: 59 y.o. male to ED via walk-in from home   Present to ED with headache, dizziness and upper extremity paresthesias. Endorses started having headaches with neck pain over the past 2 months . He had profound dizziness last night and when he was going to get breakfast with his son this morning he was a little bit slower to respond than usual. not a TNK candidate. PMHX: Diabetes, hypertension and hyperlipidemia   Admitted to [facility] with DX: Dizziness  on exam: MRI of brain negative for CVA.    PLAN: neuro checks; monitor labs; f/u MRI C-spine; f/u orthostatics; tele monitoring;Accuchecks with ssic        Date: 11/8/23     Day 2  Discharge Summary  Hospital Course: Georgia Kent is a 59 y.o. male patient who originally presented to the hospital on 11/7/2023 due to dizziness. Stroke alert initiated, stroke work-up negative. Patient also found vertebral artery stenosis, discussed the results with patient advised patient to follow-up with PCP and maybe neurology outpatient basis. Continue aspirin and statin. Limited normal.  Based on clinical exam and history seems like patient was having vertigo due to chronic sinus congestion/sinusitis. Will prescribe Augmentin. Continue Flonase. PT OT evaluated the patient, cleared the patient. Condition significantly improved with current treatment. Patient is getting discharged back home with follow-up with PCP and possible neurology outpatient basis.     Disposition:  Home / self    ED Triage Vitals   Temperature Pulse Respirations Blood Pressure SpO2   11/07/23 1035 11/07/23 1035 11/07/23 1035 11/07/23 1035 11/07/23 1035   (!) 97.3 °F (36.3 °C) 82 18 160/85 97 %      Temp Source Heart Rate Source Patient Position - Orthostatic VS BP Location FiO2 (%)   11/07/23 1035 11/07/23 1035 11/07/23 1035 11/07/23 1035 --   Temporal Monitor Lying Right arm       Pain Score       11/07/23 1200       No Pain          Wt Readings from Last 1 Encounters:   11/07/23 106 kg (233 lb 11 oz)     Additional Vital Signs:   Date/Time Temp Pulse Resp BP MAP (mmHg) SpO2 O2 Device Patient Position - Orthostatic VS   11/08/23 0801 -- -- -- 136/85 -- -- -- --   11/08/23 0734 97.7 °F (36.5 °C) 79 -- -- -- 97 % -- --   11/07/23 2300 97.7 °F (36.5 °C) 70 18 119/64 76 98 % None (Room air) Lying   11/07/23 1956 -- -- -- -- -- -- None (Room air) --   11/07/23 19:02:16 97.5 °F (36.4 °C) 95 18 127/83 98 96 % None (Room air) Lying   11/07/23 1725 -- -- -- 154/94 -- -- -- Standing - Orthostatic VS   11/07/23 1722 -- -- -- 146/73 -- -- -- Sitting - Orthostatic VS   11/07/23 1718 -- -- -- 138/57 -- -- -- Lying - Orthostatic VS   11/07/23 1630 97.9 °F (36.6 °C) 81 18 145/83 84 -- None (Room air) Sitting   11/07/23 15:30:09 97.7 °F (36.5 °C) 80 19 147/87 107 95 % None (Room air) Sitting   11/07/23 1431 97.7 °F (36.5 °C) 89 -- 162/78 106 -- -- --   11/07/23 1430 97.7 °F (36.5 °C) 88 18 162/78 -- -- -- Sitting   11/07/23 14:27:46 97.7 °F (36.5 °C) 88 -- 162/78 106 95 % -- --   11/07/23 14:15:50 97.7 °F (36.5 °C) 86 -- -- -- 95 % -- --   11/07/23 13:42:50 -- 71 -- 135/77 96 95 % -- --   11/07/23 1334 -- -- -- -- -- 95 % None (Room air) --   11/07/23 1330 97.7 °F (36.5 °C) 80 18 135/77 -- -- -- Sitting   11/07/23 1256 -- -- -- 132/74 -- -- -- --   11/07/23 1230 -- 80 16 129/69 -- 97 % None (Room air) --   11/07/23 1200 -- 73 19 131/68 -- 95 % None (Room air) Lying   11/07/23 1145 -- 77 22 135/70 -- 97 % None (Room air) --   11/07/23 1130 -- 77 22 136/63 -- 96 % None (Room air) Lying   11/07/23 1115 -- 86 20 129/59 -- 95 % None (Room air) Lying   11/07/23 10:44:41 -- 87 18 160/85 -- 97 % None (Room air) --   11/07/23 1035 97.3 °F (36.3 °C) Abnormal  82 18 160/85 -- 97 % None (Room air) Lying       EKG: Normal sinus rhythm  Normal ECG  No previous ECGs available      Pertinent Labs/Diagnostic Test Results:   MRI brain wo contrast   Final Result by CORINE Ambriz MD (11/07 1623)      No acute intracranial pathology. Mild chronic microangiopathy. Workstation performed: [id number]         XR chest portable   Final Result by Micki Maza MD (11/08 9063)      No acute cardiopulmonary disease. Workstation performed: [id number]         CT stroke alert brain   Final Result by Janet Joy MD (11/07 1144)      No acute intracranial abnormality. Findings were communicated with Dr. Carla Hilliard  at 11:42 a.m. Workstation performed: [id number]         CTA stroke alert (head/neck)   Final Result by Janet Joy MD (11/07 1144)   1. CTA head: Negative for large vessel intracranial occlusion .  Areas of mild stenosis involving the bilateral cavernous and supraclinoid ICA segments. 2. CTA neck:  No extracranial carotid stenosis. The cervical vertebral arteries are patent, with high-grade origin stenosis on the right. Findings were communicated with Dr. Mushtaq Savage  at 11:42 a.m.                            Workstation performed: [id number]         MRI inpatient order    (Results Pending)     Results from last 7 days   Lab Units 11/07/23  1104   SARS-COV-2  Negative     Results from last 7 days   Lab Units 11/08/23  0531 11/07/23  1104   WBC Thousand/uL 5.54 7.49   HEMOGLOBIN g/dL 15.9 16.2   HEMATOCRIT % 47.5 48.2   PLATELETS Thousands/uL 215 229        Results from last 7 days   Lab Units 11/08/23  0531 11/07/23  1103   SODIUM mmol/L 136 136   POTASSIUM mmol/L 4.2 5.1   CHLORIDE mmol/L 104 102   CO2 mmol/L 24 27   ANION GAP mmol/L 8 7   BUN mg/dL 16 14   CREATININE mg/dL 0.81 0.99   EGFR ml/min/1.73sq m 96 82   CALCIUM mg/dL 8.9 9.3     Results from last 7 days   Lab Units 11/08/23  0531   AST U/L 14   ALT U/L 16   ALK PHOS U/L 46   TOTAL PROTEIN g/dL 6.8   ALBUMIN g/dL 4.2   TOTAL BILIRUBIN mg/dL 0.61     Results from last 7 days   Lab Units 11/08/23  0749 11/07/23  2054 11/07/23  1527 11/07/23  1044   POC GLUCOSE mg/dl 154* 130 168* 189*     Results from last 7 days   Lab Units 11/08/23  0531 11/07/23  1103   GLUCOSE RANDOM mg/dL 144* 200*        Results from last 7 days   Lab Units 11/07/23  1103   HS TNI 0HR ng/L 2        Results from last 7 days   Lab Units 11/07/23  1103   PROTIME seconds 12.8   INR  0.93   PTT seconds 28     Results from last 7 days   Lab Units 11/07/23  1856   TSH 3RD GENERATON uIU/mL 2.018        Results from last 7 days   Lab Units 11/07/23  1103   CRP mg/L 1.6        Results from last 7 days   Lab Units 11/07/23  1104   INFLUENZA A PCR  Negative   INFLUENZA B PCR  Negative   RSV PCR  Negative          ED Treatment:   Medication Administration from 11/07/2023 1025 to 11/07/2023 4694 Date/Time Order Dose Route Action     11/07/2023 1057 EST iohexol (OMNIPAQUE) 350 MG/ML injection (MULTI-DOSE) 85 mL 85 mL Intravenous Given     11/07/2023 1248 EST aspirin chewable tablet 324 mg 324 mg Oral Given            Present on Admission:   Dizziness and giddiness   Type 2 diabetes mellitus with hemoglobin A1c goal of less than 7.0% (HCC)   Erectile dysfunction   Hypertension   Hyperlipidemia      Admitting Diagnosis: Dizziness [R42]  Chest pain [R07.9]  Vertebral artery disease ([address]) [I77.9]    Age/Sex: 59 y.o. male    Admission Orders: SCDs; tele monitoring; I/S; Consistent Carbohydrate Diet. Blood glucose checks ACHS. Scheduled Medications:  amLODIPine, 10 mg, Oral, Daily  aspirin, 81 mg, Oral, Daily  atorvastatin, 40 mg, Oral, QPM  heparin (porcine), 5,000 Units, Subcutaneous, Q8H HERB  insulin lispro, 1-6 Units, Subcutaneous, 4x Daily (AC & HS)  losartan, 100 mg, Oral, Daily  methocarbamol, 500 mg, Oral, Q8H HERB  oxybutynin, 10 mg, Oral, HS      Continuous IV Infusions: None     PRN Meds:  acetaminophen, 650 mg, Oral, Q4H PRN  LORazepam, 1 mg, Intravenous, Once PRN  oxyCODONE, 5 mg, Oral, Q4H PRN        IP CONSULT TO NEUROLOGY  IP CONSULT TO CASE MANAGEMENT  IP CONSULT TO NUTRITION SERVICES    Network Utilization Review Department  ATTENTION: Please call with any questions or concerns to [phone] and carefully listen to the prompts so that you are directed to the right person. All voicemails are confidential.   For Discharge needs, contact Care Management DC Support Team at [phone] opt. 2  Send all requests for admission clinical reviews, approved or denied determinations and any other requests to dedicated fax number below belonging to the campus where the patient is receiving treatment.  List of dedicated fax numbers for the Facilities:  [place] DENIALS (Administrative/Medical Necessity) [phone]   DISCHARGE SUPPORT TEAM ([address] [place] [address]) [phone] 1000 [facility] (Maternity/NICU/Pediatrics) [address] 1000 [facility] [phone] 1505 [facility] [address] [address] [facility] [address] [address] [facility] [address] 1300 [facility] [place] [address] [phone]

## 2023-11-08 NOTE — ASSESSMENT & PLAN NOTE
Lab Results   Component Value Date    HGBA1C 7.1 (H) 11/08/2023     Recent Labs     11/07/23  1527 11/07/23 2054 11/08/23  0749 11/08/23  1108   POCGLU 168* 130 154* 138       Resume home medication.

## 2023-11-08 NOTE — SPEECH THERAPY NOTE
Speech Language/Pathology  Consult received. Records reviewed. Pt admitted c symptoms concerning for CVA/TIA. Pt passed RN Dysphagia Assessment. Communication deficits denied. MRI results negative No additional inpatient Speech Pathology evaluation appears indicated at this time. Please re-consult if additional concerns arise. Thank you.     Sheridan Benitez, [address] [facility] CCC-SLP  11/8/2023

## 2023-11-08 NOTE — UTILIZATION REVIEW
NOTIFICATION OF INPATIENT ADMISSION   AUTHORIZATION REQUEST   SERVICING FACILITY:   [address]  Tax ID: [id number]  NPI: [id number] ATTENDING PROVIDER:  Attending Name and NPI#: Darci Ly Md [[id number]]  Address: [address] [place] [address]  Phone: [phone]   ADMISSION INFORMATION:  Place of Service: Inpatient [address]  Place of Service Code: 21  Inpatient Admission Date/Time: 11/7/23 12:25 PM  Discharge Date/Time: No discharge date for patient encounter. Admitting Diagnosis Code/Description:  Dizziness [R42]  Chest pain [R07.9]  Vertebral artery disease ([address]) [I77.9]     UTILIZATION REVIEW CONTACT:  George Mederos Utilization   Network Utilization Review Department  Phone: [phone]  Fax [phone]  Email: [email or web address]  Contact for approvals/pending authorizations, clinical reviews, and discharge. PHYSICIAN ADVISORY SERVICES:  Medical Necessity Denial & [facility] Review  Phone: [phone]  Fax: [phone]  Email: [email or web address]     DISCHARGE SUPPORT TEAM:  For Patients Discharge Needs & Updates  Phone: [phone] opt. 2 Fax: [phone]  Email: [email or web address]

## 2023-11-08 NOTE — CONSULTS
Consult received for stroke pathway. Pt works as , has been reliant on obtaining breakfast and sometimes lunches from gas stations. May have french fries or egg sandwiches or peanut butter cups or chocolate milk. Says he was decreasing portion sizes and increasing physical activity to successfully obtain 40lb weight loss. Pt frustrated that "I would only eat a small amount of food and see my BG go up to 250." Pt reporting consumption of simple refined carbohydrates. Chart review of weight hx: 1/23/23 251lb, 7/26/23 236lb, 11/7/23 233lb. Pt reports previously weighing 274lb last year, intentionally trying to lose weight. Pt states his weight has a hit a plateau and would like to lose more weight. Discussed lab values with pt. Encouraged further weight loss to help normalize these levels. Discussed packing breakast/lunch meals as pt reports he typically buys sweets/"junk food" when he stops at gas stations and is more controlled when he brings meals from home. Discussed food alternatives such as fairlife chocolate milk instead of Guers, chobani zero sugar drink as snack option, smucker's natural peanut butter instead of peanut butter cups, etc.     Pt says he will try these foods and will obtain referral from PCP as desired for OP MNT instruction. Provided with High [employer]IG Nutrition Therapy, Heart Healthy Nutrition Therapy handouts and RD contact information.

## 2023-11-08 NOTE — ASSESSMENT & PLAN NOTE
Prior to admission was taking tadalafil which may be causing headache/hypotension.     Orthostatic remained normal.

## 2023-11-08 NOTE — ASSESSMENT & PLAN NOTE
Stages of both upper extremities. Patient is a  for over 40 years and also rides his [address]. He notices his symptoms of paresthesias are worse when he rides his motorcycle  May have carpal tunnel syndrome. Patient advised to follow-up with PCP for possible MRI of the neck outpatient basis.   Vitamin B12, TSH normal.

## 2023-11-08 NOTE — PHYSICAL THERAPY NOTE
Physical Therapy Screen    Patient Name: Maddie Mckeon    TUZSB'N Date: 11/8/2023     Problem List  Active Problems:    Erectile dysfunction    Type 2 diabetes mellitus with hemoglobin A1c goal of less than 7.0% ([facility])    Dizziness and giddiness    Hypertension    Hyperlipidemia    Paresthesia and pain of both upper extremities    Vertebral artery stenosis       Past Medical History  Past Medical History:   Diagnosis Date    Dyslipidemia     Erectile dysfunction     Hypertension     Type 2 diabetes mellitus ([address])         Past Surgical History  Past Surgical History:   Procedure Laterality Date    ANKLE SURGERY Left     COLONOSCOPY             11/08/23 1143   PT Last Visit   PT Visit Date 11/08/23   Note Type   Note type Screen   Additional Comments Independent       Received order for PT consult. Chart reviewed. Pt admitted with diagnosis of Dizziness, chest pain, vertebral artery disease. Spoke with patient who reports he is at his PLOF of independent at this time. He reports ambulating independently without difficulty, no AD. PT observes pt ambulating in room and hallway IND. Vestibular screen completed with no symptoms elicited. Pt reports he feels back to normal since last night, no symptoms. Pt reports no concerns with returning home upon discharge. Will D/C PT services at this time as patient is at his PLOF of independent without acute care PT services warranted. Should patient's status change, please re-consult.     Elliott Murdock, PT,DPT

## 2023-11-08 NOTE — ASSESSMENT & PLAN NOTE
History of diabetes hypertension and hyperlipidemia who presents with intermittent headache/neck pain as well as upper extremity paresthesias  MRI of brain negative for CVA. Discussed with neurology, does not need stroke pathway. His symptoms of upper extremity paresthesias may be radicular. Telemetry remained uneventful, echocardiogram normal.  TSH and vitamin B12 normal.  Patient advised to follow-up with PCP for further recommendation, may consider MRI of the cervical neck. DVT recommendation appreciated.

## 2023-11-08 NOTE — PLAN OF CARE
Problem: MOBILITY - ADULT  Goal: Maintain or return to baseline ADL function  Description: INTERVENTIONS:  -  Assess patient's ability to carry out ADLs; assess patient's baseline for ADL function and identify physical deficits which impact ability to perform ADLs (bathing, care of mouth/teeth, toileting, grooming, dressing, etc.)  - Assess/evaluate cause of self-care deficits   - Assess range of motion  - Assess patient's mobility; develop plan if impaired  - Assess patient's need for assistive devices and provide as appropriate  - Encourage maximum independence but intervene and supervise when necessary  - Involve family in performance of ADLs  - Assess for home care needs following discharge   - Consider OT consult to assist with ADL evaluation and planning for discharge  - Provide patient education as appropriate  11/8/2023 1339 by Dolly Sheriff RN  Outcome: Adequate for Discharge  11/8/2023 1208 by Dolly Sheriff RN  Outcome: Progressing  Goal: Maintains/Returns to pre admission functional level  Description: INTERVENTIONS:  - Perform BMAT or MOVE assessment daily.   - Set and communicate daily mobility goal to care team and patient/family/caregiver.    - Collaborate with rehabilitation services on mobility goals if consulted    - Out of bed for toileting  - Record patient progress and toleration of activity level   11/8/2023 1339 by Dolly Sheriff RN  Outcome: Adequate for Discharge  11/8/2023 1208 by Dolly Sheriff RN  Outcome: Progressing     Problem: SAFETY ADULT  Goal: Maintain or return to baseline ADL function  Description: INTERVENTIONS:  -  Assess patient's ability to carry out ADLs; assess patient's baseline for ADL function and identify physical deficits which impact ability to perform ADLs (bathing, care of mouth/teeth, toileting, grooming, dressing, etc.)  - Assess/evaluate cause of self-care deficits   - Assess range of motion  - Assess patient's mobility; develop plan if impaired  - Assess patient's need for assistive devices and provide as appropriate  - Encourage maximum independence but intervene and supervise when necessary  - Involve family in performance of ADLs  - Assess for home care needs following discharge   - Consider OT consult to assist with ADL evaluation and planning for discharge  - Provide patient education as appropriate  11/8/2023 1339 by Dolly Sheriff RN  Outcome: Adequate for Discharge  11/8/2023 1208 by Dolly Sheriff RN  Outcome: Progressing  Goal: Maintains/Returns to pre admission functional level  Description: INTERVENTIONS:  - Perform BMAT or MOVE assessment daily.   - Set and communicate daily mobility goal to care team and patient/family/caregiver.    - Collaborate with rehabilitation services on mobility goals if consulted    - Out of bed for toileting  - Record patient progress and toleration of activity level   11/8/2023 1339 by Dolly Sheriff RN  Outcome: Adequate for Discharge  11/8/2023 1208 by Dolly Sheriff RN  Outcome: Progressing  Goal: Patient will remain free of falls  Description: INTERVENTIONS:  - Educate patient/family on patient safety including physical limitations  - Instruct patient to call for assistance with activity   - Consult OT/PT to assist with strengthening/mobility   - Keep Call bell within reach  - Keep bed low and locked with side rails adjusted as appropriate  - Keep care items and personal belongings within reach  - Initiate and maintain comfort rounds  - Make Fall Risk Sign visible to staff    - Apply yellow socks and bracelet for high fall risk patients  - Consider moving patient to room near nurses station  11/8/2023 1339 by Dolly Sheriff RN  Outcome: Adequate for Discharge  11/8/2023 1208 by Dolly Sheriff RN  Outcome: Progressing

## 2023-11-09 ENCOUNTER — TELEPHONE (OUTPATIENT)
Dept: NON INVASIVE DIAGNOSTICS | Facility: HOSPITAL | Age: 58
End: 2023-11-09

## 2023-11-09 ENCOUNTER — TELEPHONE (OUTPATIENT)
Dept: CARDIOLOGY CLINIC | Facility: CLINIC | Age: 58
End: 2023-11-09

## 2023-11-09 DIAGNOSIS — R13.10 DYSPHAGIA, UNSPECIFIED TYPE: Primary | ICD-10-CM

## 2023-11-09 PROBLEM — R93.1 ABNORMAL ECHOCARDIOGRAM: Status: ACTIVE | Noted: 2023-11-09

## 2023-11-09 NOTE — UTILIZATION REVIEW
NOTIFICATION OF ADMISSION DISCHARGE   This is a Notification of Discharge from [address]. Please be advised that this patient has been discharge from our facility. Below you will find the admission and discharge date and time including the patient’s disposition. UTILIZATION REVIEW CONTACT:  Catalina Cotton  Utilization   Network Utilization Review Department  Phone: [phone] x carefully listen to the prompts. All voicemails are confidential.  Email: [email or web address]     ADMISSION INFORMATION  PRESENTATION DATE: 11/7/2023 10:32 AM  OBERVATION ADMISSION DATE:   INPATIENT ADMISSION DATE: 11/7/23 12:25 PM   DISCHARGE DATE: 11/8/2023  2:14 PM   DISPOSITION:Home/Self Care    Network Utilization Review Department  ATTENTION: Please call with any questions or concerns to [phone] and carefully listen to the prompts so that you are directed to the right person. All voicemails are confidential.   For Discharge needs, contact Care Management DC Support Team at [phone] opt. 2  Send all requests for admission clinical reviews, approved or denied determinations and any other requests to dedicated fax number below belonging to the campus where the patient is receiving treatment.  List of dedicated fax numbers for the Facilities:  [place] DENIALS (Administrative/Medical Necessity) [phone]   DISCHARGE SUPPORT TEAM (Network) [phone] 230[address] [facility] (Maternity/NICU/Pediatrics) [phone] [address] [facility] [address] [place] [address] [address]  [facility] [phone]

## 2023-11-09 NOTE — TELEPHONE ENCOUNTER
I called and spoke with patient in preparation for CANDY. He admits to history of hiatal hernia with intermittent dysphagia and food such as rice and chicken getting stuck in his throat. Usually water pushes it down. He has not had a GI evaluation for this. I discussed that he should have a barium swallow study to evaluate his esophagus prior to proceeding with CANDY. He understands and I gave him the phone number for centralized scheduling to set this up. He confirmed that he does not have any loose teeth, history of radiation to the chest, hematemesis, or any other contraindications for the CANDY. Will await results of barium swallow prior to proceeding with CANDY. He is aware.

## 2023-11-09 NOTE — TELEPHONE ENCOUNTER
Left message on both home number and cell number for patient to call back to schedule CANDY. Requested he call [phone] to schedule with me.

## 2023-11-09 NOTE — ASSESSMENT & PLAN NOTE
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 Patient remain as symptomatic, after discussion is done with cardiology, CANDY ordered outpatient basis-cardiology does not need urgent  Patient also notified over the phone -patient verbalizes to understand and agrees. Patient PCP also notified via riskmethods connect.

## 2023-11-09 NOTE — TELEPHONE ENCOUNTER
Spoke with patient. Explained CANDY procedure with him. He does mention that food "sometimes' gets stuck and he needs water to wash it down. Explained that I would like the cardiologist speak with him regarding this issue before doing CANDY. We did tentatively set up CANDY procedure for 11/22 at 8am and reviewed all detailes for this procedure. Spoke with Dr Camilla Matute regarding this and she will reach out to patient and PCP.

## 2023-11-09 NOTE — QUICK NOTE
Abnormal echocardiogram:   Aortic Valve: There is a moderately sized, protruding, echogenic mass of unknown etiology on the ventricular aspect of the aortic valve. Patient remain as symptomatic, after discussion is done with cardiology, CANDY ordered outpatient basis-cardiology does not need urgent  Patient also notified over the phone -patient verbalizes to understand and agrees. Patient PCP also notified via [employer]. Patient notified over the phone on 11/9/2023-at 8;15 am-about the results and recommendations. CANDY already placed for cardiology recommendation. Cardiology department will schedule the CANDY and notify the patient.

## 2023-11-17 ENCOUNTER — HOSPITAL ENCOUNTER (OUTPATIENT)
Dept: RADIOLOGY | Facility: HOSPITAL | Age: 58
Discharge: HOME | End: 2023-11-17
Payer: COMMERCIAL

## 2023-11-17 ENCOUNTER — TELEPHONE (OUTPATIENT)
Dept: CARDIOLOGY CLINIC | Facility: CLINIC | Age: 58
End: 2023-11-17

## 2023-11-17 DIAGNOSIS — R13.10 DYSPHAGIA, UNSPECIFIED TYPE: ICD-10-CM

## 2023-11-17 PROCEDURE — 74220 X-RAY XM ESOPHAGUS 1CNTRST: CPT

## 2023-11-17 NOTE — TELEPHONE ENCOUNTER
I left a voicemail for [employer]. His barium swallow study showed mild hiatal hernia with dysmotility. I reviewed the results with Dr. Nicole Acosta. No contraindication for CANDY. Will proceed with CANDY as scheduled on 11/22/2023.

## 2023-11-20 ENCOUNTER — TELEPHONE (OUTPATIENT)
Dept: NON INVASIVE DIAGNOSTICS | Facility: HOSPITAL | Age: 58
End: 2023-11-20

## 2023-11-20 NOTE — TELEPHONE ENCOUNTER
Spoke with patient and reviewed the following instructions with him:  1). NPO after midnight Tues 11/21 until procedure time 11/22.  2). Arrival time is 7am and he must have a  with him because he may not drive home after receiving anesthesia. 3). May take AM meds on Wed morning with a sip of water  Patient stated verbal understanding of these instructions.

## 2023-11-21 ENCOUNTER — ANESTHESIA EVENT (OUTPATIENT)
Dept: NON INVASIVE DIAGNOSTICS | Facility: HOSPITAL | Age: 58
End: 2023-11-21

## 2023-11-21 NOTE — ANESTHESIA PREPROCEDURE EVALUATION
Procedure:  CANDY    Relevant Problems   CARDIO   (+) Hyperlipidemia   (+) Hypertension      ENDO   (+) Type 2 diabetes mellitus with hemoglobin A1c goal of less than 7.0% ([facility])        Physical Exam    Airway    Mallampati score: II  TM Distance: >3 FB  Neck ROM: full     Dental   No notable dental hx     Cardiovascular  Rhythm: regular, Rate: normal    Pulmonary   Breath sounds clear to auscultation    Other Findings  post-pubertal.    Left Ventricle: Left ventricular cavity size is normal. Wall thickness is normal. The left ventricular ejection fraction is 65%. Systolic function is normal. Wall motion is normal. Diastolic function is normal.    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 Tricuspid Valve: There is mild regurgitation. There is a echogenic  structure on ventricular aspect of aortic valve. Would recommend CANDY for better assessment. Anesthesia Plan  ASA Score- 3     Anesthesia Type- general with ASA Monitors. Additional Monitors:     Airway Plan: ETT. Comment: On Ozempic, stomach contents visualized on ultrasound. Plan Factors-Exercise tolerance (METS): >4 METS. Chart reviewed. EKG reviewed. Imaging results reviewed. Existing labs reviewed. Patient summary reviewed. Patient is not a current smoker. Patient not instructed to abstain from smoking on day of procedure. Patient did not smoke on day of surgery. Obstructive sleep apnea risk education given perioperatively. Induction- intravenous. Postoperative Plan-     Informed Consent- Anesthetic plan and risks discussed with patient. I personally reviewed this patient with the CRNA. Discussed and agreed on the Anesthesia Plan with the CRNA. Jefferson Redhead               Normal sinus rhythm  Normal ECG  No previous ECGs available

## 2023-11-22 ENCOUNTER — HOSPITAL ENCOUNTER (OUTPATIENT)
Dept: NON INVASIVE DIAGNOSTICS | Facility: HOSPITAL | Age: 58
Discharge: HOME | End: 2023-11-22
Attending: FAMILY MEDICINE
Payer: COMMERCIAL

## 2023-11-22 ENCOUNTER — ANESTHESIA (OUTPATIENT)
Dept: NON INVASIVE DIAGNOSTICS | Facility: HOSPITAL | Age: 58
End: 2023-11-22

## 2023-11-22 VITALS
HEIGHT: 68 IN | SYSTOLIC BLOOD PRESSURE: 109 MMHG | HEART RATE: 70 BPM | RESPIRATION RATE: 18 BRPM | TEMPERATURE: 97.6 F | DIASTOLIC BLOOD PRESSURE: 61 MMHG | WEIGHT: 227 LBS | OXYGEN SATURATION: 95 % | BODY MASS INDEX: 34.4 KG/M2

## 2023-11-22 DIAGNOSIS — R93.1 ABNORMAL ECHOCARDIOGRAM: ICD-10-CM

## 2023-11-22 PROBLEM — I35.8 NONRHEUMATIC AORTIC VALVE SCLEROSIS: Status: ACTIVE | Noted: 2023-11-22

## 2023-11-22 LAB
GLUCOSE SERPL-MCNC: 115 MG/DL (ref 65–140)
GLUCOSE SERPL-MCNC: 140 MG/DL (ref 65–140)

## 2023-11-22 PROCEDURE — 93312 ECHO TRANSESOPHAGEAL: CPT | Performed by: INTERNAL MEDICINE

## 2023-11-22 PROCEDURE — 82948 REAGENT STRIP/BLOOD GLUCOSE: CPT

## 2023-11-22 PROCEDURE — 93312 ECHO TRANSESOPHAGEAL: CPT

## 2023-11-22 PROCEDURE — 93320 DOPPLER ECHO COMPLETE: CPT | Performed by: INTERNAL MEDICINE

## 2023-11-22 PROCEDURE — 93325 DOPPLER ECHO COLOR FLOW MAPG: CPT | Performed by: INTERNAL MEDICINE

## 2023-11-22 RX ORDER — SUCCINYLCHOLINE/SOD CL,ISO/PF 100 MG/5ML
SYRINGE (ML) INTRAVENOUS AS NEEDED
Status: DISCONTINUED | OUTPATIENT
Start: 2023-11-22 | End: 2023-11-22

## 2023-11-22 RX ORDER — PROPOFOL 10 MG/ML
INJECTION, EMULSION INTRAVENOUS CONTINUOUS PRN
Status: DISCONTINUED | OUTPATIENT
Start: 2023-11-22 | End: 2023-11-22

## 2023-11-22 RX ORDER — LIDOCAINE HYDROCHLORIDE 20 MG/ML
INJECTION, SOLUTION EPIDURAL; INFILTRATION; INTRACAUDAL; PERINEURAL AS NEEDED
Status: DISCONTINUED | OUTPATIENT
Start: 2023-11-22 | End: 2023-11-22

## 2023-11-22 RX ORDER — SODIUM CHLORIDE, SODIUM LACTATE, POTASSIUM CHLORIDE, CALCIUM CHLORIDE 600; 310; 30; 20 MG/100ML; MG/100ML; MG/100ML; MG/100ML
INJECTION, SOLUTION INTRAVENOUS CONTINUOUS PRN
Status: DISCONTINUED | OUTPATIENT
Start: 2023-11-22 | End: 2023-11-22

## 2023-11-22 RX ORDER — FENTANYL CITRATE 50 UG/ML
INJECTION, SOLUTION INTRAMUSCULAR; INTRAVENOUS AS NEEDED
Status: DISCONTINUED | OUTPATIENT
Start: 2023-11-22 | End: 2023-11-22

## 2023-11-22 RX ORDER — ONDANSETRON 2 MG/ML
INJECTION INTRAMUSCULAR; INTRAVENOUS AS NEEDED
Status: DISCONTINUED | OUTPATIENT
Start: 2023-11-22 | End: 2023-11-22

## 2023-11-22 RX ADMIN — Medication 100 MG: at 08:25

## 2023-11-22 RX ADMIN — SODIUM CHLORIDE 12 MCG: 9 INJECTION, SOLUTION INTRAVENOUS at 08:38

## 2023-11-22 RX ADMIN — ONDANSETRON 4 MG: 2 INJECTION INTRAMUSCULAR; INTRAVENOUS at 08:34

## 2023-11-22 RX ADMIN — LIDOCAINE HYDROCHLORIDE 50 MG: 20 INJECTION, SOLUTION EPIDURAL; INFILTRATION; INTRACAUDAL; PERINEURAL at 08:25

## 2023-11-22 RX ADMIN — PROPOFOL 200 MG: 10 INJECTION, EMULSION INTRAVENOUS at 08:27

## 2023-11-22 RX ADMIN — FENTANYL CITRATE 50 MCG: 50 INJECTION INTRAMUSCULAR; INTRAVENOUS at 08:43

## 2023-11-22 RX ADMIN — PROPOFOL 100 MCG/KG/MIN: 10 INJECTION, EMULSION INTRAVENOUS at 08:28

## 2023-11-22 RX ADMIN — SODIUM CHLORIDE 16 MCG: 9 INJECTION, SOLUTION INTRAVENOUS at 08:36

## 2023-11-22 RX ADMIN — SODIUM CHLORIDE 12 MCG: 9 INJECTION, SOLUTION INTRAVENOUS at 08:31

## 2023-11-22 RX ADMIN — SODIUM CHLORIDE, SODIUM LACTATE, POTASSIUM CHLORIDE, AND CALCIUM CHLORIDE: .6; .31; .03; .02 INJECTION, SOLUTION INTRAVENOUS at 08:25

## 2023-11-22 NOTE — Clinical Note
Patient to [facility] via litter with CRNA escort.  Report given to Janellbedside nurse

## 2023-11-22 NOTE — ASSESSMENT & PLAN NOTE
Echocardiogram 11/7/2023 demonstrated a moderately sized, protruding, echogenic mass of unknown etiology on the ventricular aspect of the aortic valve. Transesophageal echocardiogram has been requested for better assessment. Patient has been NPO since midnight. He will undergo an ultrasound of gastric contents this morning prior to the procedure as he took Ozempic on Sunday of this week. He underwent a barium swallow study 11/17/2023 which did not demonstrate stricture. There are no contraindications for the procedure and will proceed as planned.

## 2023-11-22 NOTE — DISCHARGE INSTR - AVS FIRST PAGE
If you have questions or concerns, you can reach the [facility] team by calling office at [phone]

## 2023-11-22 NOTE — H&P
Consult Cardiology    Radha Smith 1965, 59 y.o. male MRN: [id number]    Unit/Bed#:  Encounter: [id number]    Attending Provider: Mode Hollis MD   Primary Care Provider: Andrew Moreau   Date admitted to hospital: 11/22/2023       Consults    Assessment/Plan:  Abnormal echocardiogram:  Echocardiogram 11/7/2023 demonstrated a moderately sized, protruding, echogenic mass of unknown etiology on the ventricular aspect of the aortic valve. Transesophageal echocardiogram has been requested for better assessment. Patient has been NPO since midnight. He will undergo an ultrasound of gastric contents this morning prior to the procedure as he took Ozempic on Sunday of this week. He underwent a barium swallow study 11/17/2023 which did not demonstrate stricture. There are no contraindications for the procedure and will proceed as planned. Physician Requesting Consult: Mode Hollis MD    Reason for Consult / Principal Problem: CANDY          HPI: Radha Smith is a 59y.o. year old male who has a history of diabetes, hypertension, hyperlipidemia. Alexandro Gee was admitted to [address] [place] [address] 11/7/2023 with dizziness, headache, and upper extremity paresthesia. CT, CTA, and brain MRI excluded acute findings. Echocardiogram 11/7/2023 showed a moderately sized, protruding, echogenic mass of unknown etiology on the ventricular aspect of the aortic valve. CANDY was requested for better assessment. At the time of my assessment he is feeling well. He has been NPO since midnight. He reported previously to me issues with food getting stuck in his esophagus periodically. A barium swallow study was obtained which did not show stricture. He did take Ozempic on Sunday of this week and will be undergoing an ultrasound of his gastric contents this morning. He denies chest pain, shortness of breath, fevers. No prior cardiac history. Review of Systems   Constitutional:  Negative for chills and fever.    HENT: Negative for ear pain and sore throat. Eyes:  Negative for pain and visual disturbance. Respiratory:  Negative for cough and shortness of breath. Cardiovascular:  Negative for chest pain and palpitations. Gastrointestinal:  Negative for abdominal pain and vomiting. Genitourinary:  Negative for dysuria and hematuria. Musculoskeletal:  Negative for arthralgias and back pain. Skin:  Negative for color change and rash. Neurological:  Negative for seizures and syncope. All other systems reviewed and are negative. Historical Information     Past Medical History:   Diagnosis Date    Dyslipidemia     Erectile dysfunction     Hypertension     Type 2 diabetes mellitus ([address])      Past Surgical History:   Procedure Laterality Date    ANKLE SURGERY Left     COLONOSCOPY       Social History     Substance and Sexual Activity   Alcohol Use Not Currently     Social History     Substance and Sexual Activity   Drug Use Not Currently     Social History     Tobacco Use   Smoking Status Never   Smokeless Tobacco Never       Family History: History reviewed. No pertinent family history. Meds/Allergies     current meds:   No current facility-administered medications for this encounter. No current facility-administered medications for this encounter. Allergies   Allergen Reactions    Hydrochlorothiazide GI Intolerance    Lisinopril Cough       Objective     Vitals: Blood pressure 132/75, pulse 81, temperature 99.2 °F (37.3 °C), temperature source Temporal, resp. rate 16, height 5' 8" (1.727 m), weight 103 kg (227 lb), SpO2 96 %. , Body mass index is 34.52 kg/m².     Orthostatic Blood Pressures      Flowsheet Row Most Recent Value   Blood Pressure 132/75 filed at 11/22/2023 0728            Systolic (16UAF), BUZ:422 , Min:132 , ANY:870     Diastolic (22RZL), XYP:77, Min:75, Max:75      No intake or output data in the 24 hours ending 11/22/23 0750    Weight (last 2 days)       Date/Time Weight    11/22/23 0728 103 (596)            Invasive Devices       None                   Physical Exam  Vitals and nursing note reviewed. Constitutional:       General: He is not in acute distress. Appearance: He is well-developed. He is obese. HENT:      Head: Normocephalic and atraumatic. Mouth/Throat:      Dentition: Normal dentition. Eyes:      Conjunctiva/sclera: Conjunctivae normal.   Cardiovascular:      Rate and Rhythm: Normal rate and regular rhythm. Heart sounds: No murmur heard. Pulmonary:      Effort: Pulmonary effort is normal. No respiratory distress. Breath sounds: Normal breath sounds. Comments: Breathing comfortably on room air  Abdominal:      Palpations: Abdomen is soft. Tenderness: There is no abdominal tenderness. Musculoskeletal:         General: No swelling. Cervical back: Neck supple. Right lower leg: No edema. Left lower leg: No edema. Skin:     General: Skin is warm and dry. Capillary Refill: Capillary refill takes less than 2 seconds. Neurological:      Mental Status: He is alert. Psychiatric:         Mood and Affect: Mood normal.              Laboratory Results:          CBC with diff:       Invalid input(s): "TOTALCELLSCOUNTED", "SEGS%", "GRANS%", "LYMPHS%", "EOS%", "BASO%", "ABNEUT", "ABGRANS", "ABLYMPHS", "ABMOMOS", "ABEOS", "ABBASO"      Lipid Profile:   Lab Results   Component Value Date    CHOLESTEROL 199 11/08/2023     Lab Results   Component Value Date    HDL 35 (L) 11/08/2023     Lab Results   Component Value Date    TRIG 267 (H) 11/08/2023     No results found for: "[facility]"       Imaging: I have personally reviewed pertinent reports. FL barium swallow ROUTINE esophagus    Result Date: 11/17/2023  Narrative: BARIUM SWALLOW-ESOPHAGRAM INDICATION:   R13.10: Dysphagia, unspecified.  COMPARISON:  None IMAGES: 27 FLUOROSCOPY TIME:   1 MINUTE 6 SECONDS TECHNIQUE: The patient was given effervescent granules and barium by mouth and images of the esophagus were obtained. FINDINGS: The esophagus is normal in caliber. Mild gastroesophageal dysmotility characterized by splitting of the contrast bolus and mildly delayed clearance. No mucosal lesion, ulceration or evidence of fold thickening is seen. Gastroesophageal reflux was observed. There is small sliding hiatal hernia. Impression: 1. Gastroesophageal reflux. 2.  Small sliding hiatal hernia. 3.  Mild gastroesophageal dysmotility. Workstation performed: [id number]     Echo complete w/ contrast if indicated    Addendum Date: 11/8/2023 Addendum:     Left Ventricle: Left ventricular cavity size is normal. Wall thickness is normal. The left ventricular ejection fraction is 65%. Systolic function is normal. Wall motion is normal. Diastolic function is normal.   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 Tricuspid Valve: There is mild regurgitation. There is a echogenic  structure on ventricular aspect of aortic valve. Would recommend CANDY for better assessment. Result Date: 11/8/2023  Narrative:   Left Ventricle: Left ventricular cavity size is normal. Wall thickness is normal. The left ventricular ejection fraction is 65%. Systolic function is normal. Wall motion is normal. Diastolic function is normal.   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 Tricuspid Valve: There is mild regurgitation. There is a echogenic mass on ventricular aspect of aortic valve. Would recommend CANDY for better assessment. XR chest portable    Result Date: 11/8/2023  Narrative: CHEST INDICATION:   chest pain. COMPARISON: 10/20/2022 EXAM PERFORMED/VIEWS:  XR CHEST PORTABLE FINDINGS: Cardiomediastinal silhouette appears unremarkable. The lungs are clear. No pneumothorax or pleural effusion.  Osseous structures appear within normal limits for patient age. Impression: No acute cardiopulmonary disease. Workstation performed: [id number]     MRI brain wo contrast    Result Date: 11/7/2023  Narrative: MRI BRAIN WITHOUT CONTRAST INDICATION: stroke. COMPARISON:   CT/CTA performed earlier today. TECHNIQUE:  Multiplanar, multisequence imaging of the brain was performed. IMAGE QUALITY:  Diagnostic. FINDINGS: BRAIN PARENCHYMA:  There is no discrete mass, mass effect or midline shift. There is no intracranial hemorrhage. There is no evidence of acute infarction and diffusion imaging is unremarkable. T2/FLAIR hyperintensities in the periventricular and subcortical white matter likely present mild chronic microangiopathy. VENTRICLES:  Normal for the patient's age. SELLA AND PITUITARY GLAND:  Normal. ORBITS:  Normal. PARANASAL SINUSES: Minimal left maxillary sinus mucosal thickening. VASCULATURE:  Evaluation of the major intracranial vasculature demonstrates appropriate flow voids. CALVARIUM AND SKULL BASE:  Normal. EXTRACRANIAL SOFT TISSUES:  Normal.     Impression: No acute intracranial pathology. Mild chronic microangiopathy. Workstation performed: [id number]     CT stroke alert brain    Result Date: 11/7/2023  Narrative: CT BRAIN - STROKE ALERT PROTOCOL INDICATION:   Stroke Alert. COMPARISON:  None. TECHNIQUE:  CT examination of the brain was performed. In addition to axial images, coronal reformatted images were created and submitted for interpretation. Radiation dose length product (DLP) for this visit:  823.4 mGy-cm . This examination, like all CT scans performed in the [facility], was performed utilizing techniques to minimize radiation dose exposure, including the use of iterative reconstruction and automated exposure control. IMAGE QUALITY:  Diagnostic. FINDINGS: PARENCHYMA:  No intracranial mass, mass effect or midline shift. No CT signs of acute infarction. No acute parenchymal hemorrhage.  Normal intracranial vasculature. VENTRICLES AND EXTRA-AXIAL SPACES:  Normal for the patient's age. VISUALIZED ORBITS: Normal visualized orbits. PARANASAL SINUSES: Normal visualized paranasal sinuses. CALVARIUM AND EXTRACRANIAL SOFT TISSUES:   Normal.     Impression: No acute intracranial abnormality. Findings were communicated with Dr. Leti Hanks  at 11:42 a.m. Workstation performed: [id number]     CTA stroke alert (head/neck)    Result Date: 11/7/2023  Narrative: CTA NECK AND BRAIN WITH CONTRAST INDICATION: Stroke Alert COMPARISON:   None. TECHNIQUE:   Post contrast imaging was performed after administration of iodinated contrast through the neck and brain. Post contrast axial 0.625 mm images timed to opacify the arterial system. 3D rendering was performed on an independent workstation. MIP reconstructions performed. Coronal reconstructions were performed of the noncontrast portion of the brain. Radiation dose length product (DLP) for this visit:  461 mGy-cm . This examination, like all CT scans performed in the [facility], was performed utilizing techniques to minimize radiation dose exposure, including the use of iterative reconstruction and automated exposure control. IV Contrast:  85 mL of iohexol (OMNIPAQUE) IMAGE QUALITY:   Diagnostic FINDINGS: CERVICAL VASCULATURE AORTIC ARCH AND GREAT VESSELS:  Normal aortic arch and great vessel origins. Normal visualized subclavian vessels. RIGHT VERTEBRAL ARTERY CERVICAL SEGMENT:  Normal origin. The vessel is normal in caliber throughout the neck. LEFT VERTEBRAL ARTERY CERVICAL SEGMENT: High-grade origin stenosis. Vessel is normal in caliber throughout the neck. RIGHT EXTRACRANIAL CAROTID SEGMENT: Mild atherosclerotic disease of the distal common carotid artery and proximal cervical internal carotid artery without significant stenosis compared to the more distal ICA.  LEFT EXTRACRANIAL CAROTID SEGMENT: Mild atherosclerotic disease of the distal common carotid artery and proximal cervical internal carotid artery without significant stenosis compared to the more distal ICA. NASCET criteria was used to determine the degree of internal carotid artery diameter stenosis. INTRACRANIAL VASCULATURE INTERNAL CAROTID ARTERIES:  Normal enhancement of the intracranial portions of the internal carotid arteries. Calcified atheromatous plaque along the bilateral cavernous and supraclinoid ICA segments with areas of mild luminal narrowing. Normal ophthalmic artery origins. Normal ICA terminus. ANTERIOR CIRCULATION:  Symmetric A1 segments and anterior cerebral arteries with normal enhancement. Normal anterior communicating artery. MIDDLE CEREBRAL ARTERY CIRCULATION:  M1 segment and middle cerebral artery branches demonstrate normal enhancement bilaterally. DISTAL VERTEBRAL ARTERIES:  Normal distal vertebral arteries. Posterior inferior cerebellar artery origins are normal. Normal vertebral basilar junction. BASILAR ARTERY:  Basilar artery is normal in caliber. Normal superior cerebellar arteries. POSTERIOR CEREBRAL ARTERIES: Both posterior cerebral arteries arises from the basilar tip. Both arteries demonstrate normal enhancement. Hypoplastic bilateral posterior communicating arteries. VENOUS STRUCTURES:  Normal. NON VASCULAR ANATOMY BONY STRUCTURES:  No acute osseous abnormality. SOFT TISSUES OF THE NECK: Small bilateral submandibular, jugular chain and posterior triangle lymph nodes which are below size criteria for pathologic adenopathy. Physiologic bilateral intraparotid lymph nodes. Accessory left parotid lobe overlying the left masseter muscle. THORACIC INLET:  Unremarkable. Impression: 1. CTA head: Negative for large vessel intracranial occlusion . Areas of mild stenosis involving the bilateral cavernous and supraclinoid ICA segments. 2. CTA neck:  No extracranial carotid stenosis. The cervical vertebral arteries are patent, with high-grade origin stenosis on the right. Findings were communicated with Dr. Carla Hilliard  at 11:42 a.m.  Workstation performed: [id number]       Code Status: Prior

## 2023-11-22 NOTE — ANESTHESIA POSTPROCEDURE EVALUATION
Post-Op Assessment Note    CV Status:  Stable    Pain management: adequate       Mental Status:  Awake and sleepy   Hydration Status:  Stable   PONV Controlled:  None   Airway Patency:  Patent  Two or more mitigation strategies used for obstructive sleep apnea   Post Op Vitals Reviewed: Yes    No anethesia notable event occurred.     Staff: DEVYN               BP 92/60 (11/22/23 0857)    Temp   98   Pulse 80 (11/22/23 0857)   Resp 16 (11/22/23 0857)    SpO2 98 % (11/22/23 0857)

## 2023-11-22 NOTE — NURSING NOTE
Dr. Cardozo Generous at bedside to do ultrasound, stomach contents noted, procedure being moved to OR

## 2024-01-05 ENCOUNTER — HOSPITAL ENCOUNTER (EMERGENCY)
Facility: HOSPITAL | Age: 59
Discharge: HOME | End: 2024-01-05
Attending: EMERGENCY MEDICINE
Payer: COMMERCIAL

## 2024-01-05 ENCOUNTER — APPOINTMENT (EMERGENCY)
Dept: CT IMAGING | Facility: HOSPITAL | Age: 59
End: 2024-01-05
Payer: COMMERCIAL

## 2024-01-05 VITALS
RESPIRATION RATE: 18 BRPM | OXYGEN SATURATION: 96 % | TEMPERATURE: 98.4 F | BODY MASS INDEX: 34.45 KG/M2 | DIASTOLIC BLOOD PRESSURE: 78 MMHG | HEIGHT: 68 IN | WEIGHT: 227.29 LBS | HEART RATE: 58 BPM | SYSTOLIC BLOOD PRESSURE: 142 MMHG

## 2024-01-05 DIAGNOSIS — G50.0 TIC DOULOUREUX: Primary | ICD-10-CM

## 2024-01-05 DIAGNOSIS — R68.84 JAW PAIN: ICD-10-CM

## 2024-01-05 LAB
ALBUMIN SERPL BCP-MCNC: 4.7 G/DL (ref 3.5–5)
ALP SERPL-CCNC: 42 U/L (ref 34–104)
ALT SERPL W P-5'-P-CCNC: 15 U/L (ref 7–52)
ANION GAP SERPL CALCULATED.3IONS-SCNC: 7 MMOL/L
AST SERPL W P-5'-P-CCNC: 15 U/L (ref 13–39)
ATRIAL RATE: 63 BPM
BASOPHILS # BLD AUTO: 0.07 THOUSANDS/ÂΜL (ref 0–0.1)
BASOPHILS NFR BLD AUTO: 1 % (ref 0–1)
BILIRUB SERPL-MCNC: 0.42 MG/DL (ref 0.2–1)
BUN SERPL-MCNC: 16 MG/DL (ref 5–25)
CALCIUM SERPL-MCNC: 9.4 MG/DL (ref 8.4–10.2)
CARDIAC TROPONIN I PNL SERPL HS: 2 NG/L
CHLORIDE SERPL-SCNC: 104 MMOL/L (ref 96–108)
CO2 SERPL-SCNC: 24 MMOL/L (ref 21–32)
CREAT SERPL-MCNC: 0.79 MG/DL (ref 0.6–1.3)
EOSINOPHIL # BLD AUTO: 0.1 THOUSAND/ÂΜL (ref 0–0.61)
EOSINOPHIL NFR BLD AUTO: 1 % (ref 0–6)
ERYTHROCYTE [DISTWIDTH] IN BLOOD BY AUTOMATED COUNT: 12.8 % (ref 11.6–15.1)
FLUAV RNA RESP QL NAA+PROBE: NEGATIVE
FLUBV RNA RESP QL NAA+PROBE: NEGATIVE
GFR SERPL CREATININE-BSD FRML MDRD: 97 ML/MIN/1.73SQ M
GLUCOSE SERPL-MCNC: 126 MG/DL (ref 65–140)
HCT VFR BLD AUTO: 46.4 % (ref 36.5–49.3)
HGB BLD-MCNC: 15.4 G/DL (ref 12–17)
IMM GRANULOCYTES # BLD AUTO: 0.01 THOUSAND/UL (ref 0–0.2)
IMM GRANULOCYTES NFR BLD AUTO: 0 % (ref 0–2)
LYMPHOCYTES # BLD AUTO: 2.27 THOUSANDS/ÂΜL (ref 0.6–4.47)
LYMPHOCYTES NFR BLD AUTO: 30 % (ref 14–44)
MCH RBC QN AUTO: 29.6 PG (ref 26.8–34.3)
MCHC RBC AUTO-ENTMCNC: 33.2 G/DL (ref 31.4–37.4)
MCV RBC AUTO: 89 FL (ref 82–98)
MONOCYTES # BLD AUTO: 0.56 THOUSAND/ÂΜL (ref 0.17–1.22)
MONOCYTES NFR BLD AUTO: 7 % (ref 4–12)
NEUTROPHILS # BLD AUTO: 4.55 THOUSANDS/ÂΜL (ref 1.85–7.62)
NEUTS SEG NFR BLD AUTO: 61 % (ref 43–75)
NRBC BLD AUTO-RTO: 0 /100 WBCS
P AXIS: 33 DEGREES
PLATELET # BLD AUTO: 236 THOUSANDS/UL (ref 149–390)
PMV BLD AUTO: 9.4 FL (ref 8.9–12.7)
POTASSIUM SERPL-SCNC: 4 MMOL/L (ref 3.5–5.3)
PR INTERVAL: 164 MS
PROT SERPL-MCNC: 7.4 G/DL (ref 6.4–8.4)
QRS AXIS: -10 DEGREES
QRSD INTERVAL: 88 MS
QT INTERVAL: 362 MS
QTC INTERVAL: 370 MS
RBC # BLD AUTO: 5.2 MILLION/UL (ref 3.88–5.62)
RSV RNA RESP QL NAA+PROBE: NEGATIVE
SARS-COV-2 RNA RESP QL NAA+PROBE: NEGATIVE
SODIUM SERPL-SCNC: 135 MMOL/L (ref 135–147)
T WAVE AXIS: 24 DEGREES
VENTRICULAR RATE: 63 BPM
WBC # BLD AUTO: 7.56 THOUSAND/UL (ref 4.31–10.16)

## 2024-01-05 PROCEDURE — 93010 ELECTROCARDIOGRAM REPORT: CPT | Performed by: INTERNAL MEDICINE

## 2024-01-05 PROCEDURE — 84484 ASSAY OF TROPONIN QUANT: CPT | Performed by: EMERGENCY MEDICINE

## 2024-01-05 PROCEDURE — 99285 EMERGENCY DEPT VISIT HI MDM: CPT | Performed by: EMERGENCY MEDICINE

## 2024-01-05 PROCEDURE — 0241U HB NFCT DS VIR RESP RNA 4 TRGT: CPT | Performed by: EMERGENCY MEDICINE

## 2024-01-05 PROCEDURE — 85025 COMPLETE CBC W/AUTO DIFF WBC: CPT | Performed by: EMERGENCY MEDICINE

## 2024-01-05 PROCEDURE — 93005 ELECTROCARDIOGRAM TRACING: CPT

## 2024-01-05 PROCEDURE — 99285 EMERGENCY DEPT VISIT HI MDM: CPT

## 2024-01-05 PROCEDURE — 80053 COMPREHEN METABOLIC PANEL: CPT | Performed by: EMERGENCY MEDICINE

## 2024-01-05 PROCEDURE — G1004 CDSM NDSC: HCPCS

## 2024-01-05 PROCEDURE — 70450 CT HEAD/BRAIN W/O DYE: CPT

## 2024-01-05 RX ORDER — GABAPENTIN 300 MG/1
300 CAPSULE ORAL 2 TIMES DAILY
Qty: 20 CAPSULE | Refills: 0 | Status: SHIPPED | OUTPATIENT
Start: 2024-01-05 | End: 2024-01-15

## 2024-01-05 NOTE — Clinical Note
Kelvin Cuevas was seen and treated in our emergency department on 1/5/2024.                Diagnosis:     Kevlin  may return to work on return date.    He may return on this date: 01/08/2024         If you have any questions or concerns, please don't hesitate to call.      Andrew Marsh MD    ______________________________           _______________          _______________  Hospital Representative                              Date                                Time

## 2024-01-05 NOTE — ED PROVIDER NOTES
History  Chief Complaint   Patient presents with    Jaw Pain     Ear and jaw pain that has been going on for 2-3 weeks now. Has been increasingly worse over the last couple of days and was woken up out of his sleep because of it last night.       History provided by:  Medical records and patient  Medical Problem  Location:  Right facial pain  Severity:  Moderate  Onset quality:  Gradual  Duration:  1 week  Timing:  Intermittent  Progression:  Waxing and waning  Chronicity:  New  Context:  Patient has been experiencing right-sided facial pain, fluctuating intensity, radiating from right preauricular region to the right jaw, right maxillary region and into the right frontal region.  Relieved by:  Nothing  Worsened by:  Eating at times, touching the area at times  Ineffective treatments:  Tylenol  Associated symptoms: ear pain    Associated symptoms: no abdominal pain, no chest pain, no cough, no fatigue, no fever, no headaches, no nausea, no rash, no rhinorrhea, no shortness of breath, no sore throat and no vomiting        Prior to Admission Medications   Prescriptions Last Dose Informant Patient Reported? Taking?   Aspirin 81 MG CAPS  Self Yes No   Sig: Take by mouth   Continuous Blood Gluc Sensor (IdentiaStyle Tyrell 3 Sensor) MISC  Self Yes No   Sig: Use as directed   Jardiance 25 MG TABS  Self Yes No   Sig: Take 10 mg by mouth every morning   amLODIPine (NORVASC) 5 mg tablet  Self Yes No   Sig: Take 10 mg by mouth daily   ergocalciferol (VITAMIN D2) 50,000 units  Self Yes No   Sig: TAKE 1 CAPSULE BY MOUTH TWICE MONTHLY   fluticasone (FLONASE) 50 mcg/act nasal spray   No No   Si spray into each nostril daily   loratadine (Claritin) 10 mg tablet  Self Yes No   Sig: Take 10 mg by mouth if needed for allergies   losartan (COZAAR) 50 mg tablet  Self Yes No   Sig: Take 100 mg by mouth daily   meloxicam (Mobic) 15 mg tablet   No No   Sig: Take 1 tablet (15 mg total) by mouth daily   metFORMIN (GLUCOPHAGE) 500 mg tablet   Self Yes No   Sig: Take 500 mg by mouth 2 (two) times a day with meals   oxybutynin (DITROPAN-XL) 10 MG 24 hr tablet  Self No No   Sig: Take 1 tablet (10 mg total) by mouth daily at bedtime   rosuvastatin (CRESTOR) 10 MG tablet  Self Yes No   Sig: Take 10 mg by mouth daily   semaglutide, 0.25 or 0.5 mg/dose, (Ozempic, 0.25 or 0.5 MG/DOSE,) 2 mg/3 mL injection pen  Self Yes No   Sig: Inject under the skin every 7 days   tadalafil (CIALIS) 20 MG tablet   No No   Sig: Take 1 tablet (20 mg total) by mouth daily as needed for erectile dysfunction      Facility-Administered Medications: None       Past Medical History:   Diagnosis Date    Dyslipidemia     Erectile dysfunction     Hypertension     Type 2 diabetes mellitus (HCC)        Past Surgical History:   Procedure Laterality Date    ANKLE SURGERY Left     COLONOSCOPY         No family history on file.  I have reviewed and agree with the history as documented.    E-Cigarette/Vaping    E-Cigarette Use Never User      E-Cigarette/Vaping Substances    Nicotine No     THC No     CBD No     Flavoring No     Other No     Unknown No      Social History     Tobacco Use    Smoking status: Never    Smokeless tobacco: Never   Vaping Use    Vaping status: Never Used   Substance Use Topics    Alcohol use: Not Currently    Drug use: Not Currently       Review of Systems   Constitutional:  Negative for appetite change, chills, fatigue and fever.   HENT:  Positive for ear pain and sinus pain. Negative for dental problem, ear discharge, facial swelling, hearing loss, mouth sores, rhinorrhea, sinus pressure, sore throat, tinnitus, trouble swallowing and voice change.    Eyes:  Negative for pain, discharge and visual disturbance.   Respiratory:  Negative for cough, chest tightness and shortness of breath.    Cardiovascular:  Negative for chest pain and palpitations.   Gastrointestinal:  Negative for abdominal pain, nausea and vomiting.   Endocrine: Negative for polydipsia, polyphagia and  polyuria.   Genitourinary:  Negative for difficulty urinating, dysuria, hematuria and testicular pain.   Musculoskeletal:  Negative for arthralgias and back pain.   Skin:  Negative for color change and rash.   Allergic/Immunologic: Negative for immunocompromised state.   Neurological:  Negative for dizziness, seizures, syncope, weakness and headaches.   Hematological:  Negative for adenopathy.   Psychiatric/Behavioral:  Negative for confusion and dysphoric mood.    All other systems reviewed and are negative.      Physical Exam  Physical Exam  Vitals and nursing note reviewed.   Constitutional:       General: He is not in acute distress.     Appearance: Normal appearance. He is not ill-appearing, toxic-appearing or diaphoretic.   HENT:      Head: Normocephalic and atraumatic.      Comments: The patient has some mild hyperesthesia with palpation of the right preauricular region, right mandibular region and right submandibular region.  No swelling.  No facial erythema.     Right Ear: Tympanic membrane, ear canal and external ear normal. There is no impacted cerumen.      Left Ear: Tympanic membrane, ear canal and external ear normal. There is no impacted cerumen.      Nose: Nose normal. No congestion or rhinorrhea.      Mouth/Throat:      Mouth: Mucous membranes are moist.      Pharynx: Oropharynx is clear. No oropharyngeal exudate or posterior oropharyngeal erythema.   Eyes:      General:         Right eye: No discharge.         Left eye: No discharge.      Extraocular Movements: Extraocular movements intact.      Conjunctiva/sclera: Conjunctivae normal.      Pupils: Pupils are equal, round, and reactive to light.   Cardiovascular:      Rate and Rhythm: Normal rate and regular rhythm.      Pulses: Normal pulses.      Heart sounds: Normal heart sounds. No murmur heard.     No gallop.   Pulmonary:      Effort: Pulmonary effort is normal. No respiratory distress.      Breath sounds: Normal breath sounds. No stridor. No  wheezing, rhonchi or rales.   Chest:      Chest wall: No tenderness.   Abdominal:      General: Bowel sounds are normal. There is no distension.      Palpations: Abdomen is soft. There is no mass.      Tenderness: There is no abdominal tenderness. There is no right CVA tenderness, left CVA tenderness, guarding or rebound.      Hernia: No hernia is present.   Musculoskeletal:         General: Normal range of motion.      Cervical back: Normal range of motion and neck supple.   Skin:     General: Skin is warm and dry.      Capillary Refill: Capillary refill takes less than 2 seconds.   Neurological:      General: No focal deficit present.      Mental Status: He is alert and oriented to person, place, and time.      Cranial Nerves: No cranial nerve deficit.      Sensory: No sensory deficit.      Motor: No weakness.      Coordination: Coordination normal.      Gait: Gait normal.      Deep Tendon Reflexes: Reflexes normal.   Psychiatric:         Mood and Affect: Mood normal.         Behavior: Behavior normal.         Thought Content: Thought content normal.         Judgment: Judgment normal.         Vital Signs  ED Triage Vitals [01/05/24 1205]   Temperature Pulse Respirations Blood Pressure SpO2   98.4 °F (36.9 °C) 72 16 166/86 98 %      Temp Source Heart Rate Source Patient Position - Orthostatic VS BP Location FiO2 (%)   Oral Monitor Lying Right arm --      Pain Score       9           Vitals:    01/05/24 1205 01/05/24 1245 01/05/24 1300   BP: 166/86 141/89 142/78   Pulse: 72 74 58   Patient Position - Orthostatic VS: Lying Sitting Sitting         Visual Acuity      ED Medications  Medications - No data to display    Diagnostic Studies  Results Reviewed       Procedure Component Value Units Date/Time    FLU/RSV/COVID - if FLU/RSV clinically relevant [[id number]]  (Normal) Collected: 01/05/24 1235    Lab Status: Final result Specimen: Nares from Nasopharyngeal Swab Updated: 01/05/24 1318     SARS-CoV-2 Negative      INFLUENZA A PCR Negative     INFLUENZA B PCR Negative     RSV PCR Negative    Narrative:      FOR PEDIATRIC PATIENTS - copy/paste COVID Guidelines URL to browser: [email or web address]    SARS-CoV-2 assay is a Nucleic Acid Amplification assay intended for the  qualitative detection of nucleic acid from SARS-CoV-2 in nasopharyngeal  swabs. Results are for the presumptive identification of SARS-CoV-2 RNA.    Positive results are indicative of infection with SARS-CoV-2, the virus  causing COVID-19, but do not rule out bacterial infection or co-infection  with other viruses. Laboratories within the [place] and its  territories are required to report all positive results to the appropriate  public health authorities. Negative results do not preclude SARS-CoV-2  infection and should not be used as the sole basis for treatment or other  patient management decisions. Negative results must be combined with  clinical observations, patient history, and epidemiological information.  This test has not been FDA cleared or approved.    This test has been authorized by FDA under an Emergency Use Authorization  (EUA). This test is only authorized for the duration of time the  declaration that circumstances exist justifying the authorization of the  emergency use of an in vitro diagnostic tests for detection of SARS-CoV-2  virus and/or diagnosis of COVID-19 infection under section 564(b)(1) of  the Act, 21 U.S.C. 360bbb-3(b)(1), unless the authorization is terminated  or revoked sooner. The test has been validated but independent review by FDA  and CLIA is pending.    Test performed using Pervasip: This RT-PCR assay targets N2,  a region unique to SARS-CoV-2. A conserved region in the E-gene was chosen  for pan-Sarbecovirus detection which includes SARS-CoV-2.    According to CMS-2020-01-R, this platform meets the definition of high-throughput technology.    HS  Troponin 0hr (reflex protocol) [[id number]]  (Normal) Collected: 01/05/24 1235    Lab Status: Final result Specimen: Blood from Arm, Left Updated: 01/05/24 1304     hs TnI 0hr 2 ng/L     Comprehensive metabolic panel [[id number]] Collected: 01/05/24 1235    Lab Status: Final result Specimen: Blood from Arm, Left Updated: 01/05/24 1256     Sodium 135 mmol/L      Potassium 4.0 mmol/L      Chloride 104 mmol/L      CO2 24 mmol/L      ANION GAP 7 mmol/L      BUN 16 mg/dL      Creatinine 0.79 mg/dL      Glucose 126 mg/dL      Calcium 9.4 mg/dL      AST 15 U/L      ALT 15 U/L      Alkaline Phosphatase 42 U/L      Total Protein 7.4 g/dL      Albumin 4.7 g/dL      Total Bilirubin 0.42 mg/dL      eGFR 97 ml/min/1.73sq m     Narrative:      National Kidney Disease Foundation guidelines for Chronic Kidney Disease (CKD):     Stage 1 with normal or high GFR (GFR > 90 mL/min/1.73 square meters)    Stage 2 Mild CKD (GFR = 60-89 mL/min/1.73 square meters)    Stage 3A Moderate CKD (GFR = 45-59 mL/min/1.73 square meters)    Stage 3B Moderate CKD (GFR = 30-44 mL/min/1.73 square meters)    Stage 4 Severe CKD (GFR = 15-29 mL/min/1.73 square meters)    Stage 5 End Stage CKD (GFR <15 mL/min/1.73 square meters)  Note: GFR calculation is accurate only with a steady state creatinine    CBC and differential [[id number]] Collected: 01/05/24 1235    Lab Status: Final result Specimen: Blood from Arm, Left Updated: 01/05/24 1242     WBC 7.56 Thousand/uL      RBC 5.20 Million/uL      Hemoglobin 15.4 g/dL      Hematocrit 46.4 %      MCV 89 fL      MCH 29.6 pg      MCHC 33.2 g/dL      RDW 12.8 %      MPV 9.4 fL      Platelets 236 Thousands/uL      nRBC 0 /100 WBCs      Neutrophils Relative 61 %      Immat GRANS % 0 %      Lymphocytes Relative 30 %      Monocytes Relative 7 %      Eosinophils Relative 1 %      Basophils Relative 1 %      Neutrophils Absolute 4.55 Thousands/µL      Immature Grans Absolute 0.01 Thousand/uL      Lymphocytes Absolute 2.27  Thousands/µL      Monocytes Absolute 0.56 Thousand/µL      Eosinophils Absolute 0.10 Thousand/µL      Basophils Absolute 0.07 Thousands/µL                    CT head without contrast   Final Result by Arley Mcdonnell DO (01/05 1306)      No acute intracranial abnormality.                  Workstation performed: [id number]                    Procedures  Procedures         ED Course                               SBIRT 22yo+      Flowsheet Row Most Recent Value   Initial Alcohol Screen: US AUDIT-C     1. How often do you have a drink containing alcohol? 0 Filed at: 01/05/2024 1207   2. How many drinks containing alcohol do you have on a typical day you are drinking?  0 Filed at: 01/05/2024 1207   3a. Male UNDER 65: How often do you have five or more drinks on one occasion? 0 Filed at: 01/05/2024 1207   3b. FEMALE Any Age, or MALE 65+: How often do you have 4 or more drinks on one occassion? 0 Filed at: 01/05/2024 1207   Audit-C Score 0 Filed at: 01/05/2024 1207   VIANEY: How many times in the past year have you...    Used an illegal drug or used a prescription medication for non-medical reasons? Never Filed at: 01/05/2024 1207                      Medical Decision Making  1212: Patient appears well, vital signs reviewed.  Patient has been having atypical right-sided facial pain that radiates down to his right jaw into his right maxillary region and at times up to his right frontal region.  Symptoms have been present for the past week intermittent, currently 2/10 in intensity.  Last night was a 10 out of 10 which prompted him to have evaluation today.  The patient has been seen by his dentist, they have done x-rays no findings of infection, denies dental pain.  Patient has been seen by his PCP, x-rays of his cervical spine were completed they were felt to be normal.  Patient is being prepared to have an MRI of his cervical spine for further evaluation.  However patient's symptoms do not suggest cervical  pathology.  Concerns were tic douloureux.  Triage EKG nonischemic.  Given the right-sided facial pain and headache.  Plan to complete CT head to rule out other pathology.  Send COVID/flu/RSV PCR.  Check basic labs.  Patient denies need for analgesics at this time.    1430: CT and labs reviewed.  Pain well-controlled.  Trial of gabapentin.  Stable for discharge, patient has close follow-up with Dr. Danielle.    Amount and/or Complexity of Data Reviewed  Labs: ordered.  Radiology: ordered and independent interpretation performed.     Details: CT head no acute pathology  ECG/medicine tests: ordered and independent interpretation performed.     Details: Normal sinus rhythm 63 bpm no acute ischemia.    Risk  Prescription drug management.             Disposition  Final diagnoses:   Tic douloureux   Jaw pain     Time reflects when diagnosis was documented in both MDM as applicable and the Disposition within this note       Time User Action Codes Description Comment    1/5/2024  1:17 PM Andrew Marsh Add [G50.0] Tic douloureux     1/5/2024  1:17 PM Andrew Marsh Add [R68.84] Jaw pain           ED Disposition       ED Disposition   Discharge    Condition   Stable    Date/Time   Fri Jan 5, 2024 1316    Comment   Kelvin Cuevas discharge to home/self care.                   Follow-up Information       Follow up With Specialties Details Why Contact Info    Prince Collado Physician Assistant Schedule an appointment as soon as possible for a visit   [address]  [address]  [facility] [address]  [phone]              Discharge Medication List as of 1/5/2024  1:18 PM        START taking these medications    Details   gabapentin (Neurontin) 300 mg capsule Take 1 capsule (300 mg total) by mouth 2 (two) times a day for 10 days For post-herpetic neuralgia: Take 1 tablet on day 1,  Then take 2 tablets on day 2, Then take 3 tablets on day 3 and every day after that as instructed by your doctor., Starting Fri 1 /5/2024,  Until Mon 1/15/2024, Normal           CONTINUE these medications which have NOT CHANGED    Details   amLODIPine (NORVASC) 5 mg tablet Take 10 mg by mouth daily, Starting Sat 4/22/2023, Historical Med      Aspirin 81 MG CAPS Take by mouth, Historical Med      Continuous Blood Gluc Sensor (FreeStyle Tyrell 3 Sensor) MISC Use as directed, Starting Mon 8/14/2023, Historical Med      ergocalciferol (VITAMIN D2) 50,000 units TAKE 1 CAPSULE BY MOUTH TWICE MONTHLY, Historical Med      fluticasone (FLONASE) 50 mcg/act nasal spray 1 spray into each nostril daily, Starting Wed 11/8/2023, Normal      Jardiance 25 MG TABS Take 10 mg by mouth every morning, Starting Wed 5/24/2023, Historical Med      loratadine (Claritin) 10 mg tablet Take 10 mg by mouth if needed for allergies, Historical Med      losartan (COZAAR) 50 mg tablet Take 100 mg by mouth daily, Historical Med      meloxicam (Mobic) 15 mg tablet Take 1 tablet (15 mg total) by mouth daily, Starting Wed 10/11/2023, Normal      metFORMIN (GLUCOPHAGE) 500 mg tablet Take 500 mg by mouth 2 (two) times a day with meals, Historical Med      oxybutynin (DITROPAN-XL) 10 MG 24 hr tablet Take 1 tablet (10 mg total) by mouth daily at bedtime, Starting Wed 6/28/2023, Normal      rosuvastatin (CRESTOR) 10 MG tablet Take 10 mg by mouth daily, Starting Wed 7/26/2023, Historical Med      semaglutide, 0.25 or 0.5 mg/dose, (Ozempic, 0.25 or 0.5 MG/DOSE,) 2 mg/3 mL injection pen Inject under the skin every 7 days, Starting Wed 7/26/2023, Historical Med      tadalafil (CIALIS) 20 MG tablet Take 1 tablet (20 mg total) by mouth daily as needed for erectile dysfunction, Starting Wed 10/11/2023, Print             No discharge procedures on file.    PDMP Review         Value Time User    PDMP Reviewed  Yes 11/8/2023  1:19 PM Angela Kennedy MD            ED Provider  Electronically Signed by             Andrew Marsh MD  01/05/24 7300

## 2024-01-05 NOTE — Clinical Note
Kelvin Cuevas was seen and treated in our emergency department on 1/5/2024.                Diagnosis:     Kelvin  may return to work on return date.    He may return on this date: 01/06/2024         If you have any questions or concerns, please don't hesitate to call.      Andrew Marsh MD    ______________________________           _______________          _______________  Hospital Representative                              Date                                Time

## 2024-01-07 PROBLEM — J01.80 OTHER ACUTE SINUSITIS: Status: RESOLVED | Noted: 2023-11-08 | Resolved: 2024-01-07

## 2024-01-11 ENCOUNTER — OFFICE VISIT (OUTPATIENT)
Dept: URGENT CARE | Facility: CLINIC | Age: 59
End: 2024-01-11
Payer: COMMERCIAL

## 2024-01-11 VITALS
WEIGHT: 227 LBS | TEMPERATURE: 100.2 F | OXYGEN SATURATION: 94 % | HEIGHT: 68 IN | DIASTOLIC BLOOD PRESSURE: 77 MMHG | SYSTOLIC BLOOD PRESSURE: 142 MMHG | RESPIRATION RATE: 24 BRPM | BODY MASS INDEX: 34.4 KG/M2 | HEART RATE: 100 BPM

## 2024-01-11 DIAGNOSIS — R68.89 FLU-LIKE SYMPTOMS: Primary | ICD-10-CM

## 2024-01-11 LAB
SARS-COV-2 AG UPPER RESP QL IA: NEGATIVE
VALID CONTROL: NORMAL

## 2024-01-11 PROCEDURE — 87811 SARS-COV-2 COVID19 W/OPTIC: CPT | Performed by: PHYSICIAN ASSISTANT

## 2024-01-11 PROCEDURE — 99213 OFFICE O/P EST LOW 20 MIN: CPT | Performed by: PHYSICIAN ASSISTANT

## 2024-01-11 NOTE — PROGRESS NOTES
[facility]        NAME: Kelvin Cuevas is a 58 y.o. male  : 1965    MRN: [id number]  DATE: 2024  TIME: 4:11 PM    Assessment and Plan   Flu-like symptoms [R68.89]  1. Flu-like symptoms  Poct Covid 19 Rapid Antigen Test            Patient Instructions   Drink plenty of fluids.  May use over the counter cold medications for symptomatic treatment. Do not use medications with Pseudoephedrine or Phenylphrine if you have high blood pressure because it may worsen your blood pressure. Follow up with your PCP in 3-5 days if your symptoms do not improve or if you have any concerns. Go to the ER if symptoms become severe.      Follow up with PCP in 3-5 days.  Proceed to  ER if symptoms worsen.    Chief Complaint     Chief Complaint   Patient presents with    Cold Like Symptoms     Cough, chest congestion, pain in chest with inspiration, chills, and body aches starting 2 days ago.         History of Present Illness       Patient is a 58-year-old male significant past medical history of hypertension, hyperlipidemia, diabetes presents the office planing of subjective fevers and chills, chest congestion, nasal congestion, body aches, and cough for 2 days.        Review of Systems   Review of Systems   Constitutional:  Positive for chills, fatigue and fever.   HENT:  Positive for congestion. Negative for sore throat.    Respiratory:  Positive for cough. Negative for shortness of breath.    Cardiovascular:  Negative for chest pain and palpitations.   Gastrointestinal:  Positive for nausea. Negative for abdominal pain, diarrhea and vomiting.   Musculoskeletal:  Positive for myalgias.   Skin:  Negative for rash.   Neurological:  Positive for headaches. Negative for dizziness and light-headedness.         Current Medications       Current Outpatient Medications:     amLODIPine (NORVASC) 5 mg tablet, Take 10 mg by mouth daily, Disp: , Rfl:     Aspirin 81 MG CAPS, Take by mouth, Disp: , Rfl:      Continuous Blood Gluc Sensor (FreeStyle Tyrell 3 Sensor) MISC, Use as directed, Disp: , Rfl:     ergocalciferol (VITAMIN D2) 50,000 units, TAKE 1 CAPSULE BY MOUTH TWICE MONTHLY, Disp: , Rfl:     fluticasone (FLONASE) 50 mcg/act nasal spray, 1 spray into each nostril daily, Disp: 9.9 mL, Rfl: 0    gabapentin (Neurontin) 300 mg capsule, Take 1 capsule (300 mg total) by mouth 2 (two) times a day for 10 days For post-herpetic neuralgia: Take 1 tablet on day 1,  Then take 2 tablets on day 2, Then take 3 tablets on day 3 and every day after that as instructed by your doctor., Disp: 20 capsule, Rfl: 0    Jardiance 25 MG TABS, Take 10 mg by mouth every morning, Disp: , Rfl:     loratadine (Claritin) 10 mg tablet, Take 10 mg by mouth if needed for allergies, Disp: , Rfl:     losartan (COZAAR) 50 mg tablet, Take 100 mg by mouth daily, Disp: , Rfl:     meloxicam (Mobic) 15 mg tablet, Take 1 tablet (15 mg total) by mouth daily, Disp: 14 tablet, Rfl: 0    metFORMIN (GLUCOPHAGE) 500 mg tablet, Take 500 mg by mouth 2 (two) times a day with meals, Disp: , Rfl:     oxybutynin (DITROPAN-XL) 10 MG 24 hr tablet, Take 1 tablet (10 mg total) by mouth daily at bedtime, Disp: 90 tablet, Rfl: 3    rosuvastatin (CRESTOR) 10 MG tablet, Take 10 mg by mouth daily, Disp: , Rfl:     semaglutide, 0.25 or 0.5 mg/dose, (Ozempic, 0.25 or 0.5 MG/DOSE,) 2 mg/3 mL injection pen, Inject under the skin every 7 days, Disp: , Rfl:     tadalafil (CIALIS) 20 MG tablet, Take 1 tablet (20 mg total) by mouth daily as needed for erectile dysfunction, Disp: 30 tablet, Rfl: 3    Current Allergies     Allergies as of 01/11/2024 - Reviewed 01/11/2024   Allergen Reaction Noted    Hydrochlorothiazide GI Intolerance 06/26/2023    Lisinopril Cough 06/26/2023            The following portions of the patient's history were reviewed and updated as appropriate: allergies, current medications, past family history, past medical history, past social history, past surgical  "history and problem list.     Past Medical History:   Diagnosis Date    Allergic     Dyslipidemia     Erectile dysfunction     Hypertension     Type 2 diabetes mellitus (HCC)        Past Surgical History:   Procedure Laterality Date    ANKLE SURGERY Left     COLONOSCOPY         History reviewed. No pertinent family history.      Medications have been verified.        Objective   /77   Pulse 100   Temp 100.2 °F (37.9 °C)   Resp (!) 24   Ht 5' 8\" (1.727 m)   Wt 103 kg (227 lb) Comment: pt reported  SpO2 94%   BMI 34.52 kg/m²   No LMP for male patient.       Physical Exam     Physical Exam  Vitals and nursing note reviewed.   Constitutional:       Appearance: Normal appearance. He is well-developed.   HENT:      Head: Normocephalic and atraumatic.      Right Ear: Tympanic membrane, ear canal and external ear normal.      Left Ear: Tympanic membrane, ear canal and external ear normal.      Nose: Congestion present.      Mouth/Throat:      Pharynx: Uvula midline.   Eyes:      General: Lids are normal.      Conjunctiva/sclera: Conjunctivae normal.      Pupils: Pupils are equal, round, and reactive to light.   Cardiovascular:      Rate and Rhythm: Normal rate and regular rhythm.      Heart sounds: Normal heart sounds. No murmur heard.     No friction rub. No gallop.   Pulmonary:      Effort: Pulmonary effort is normal.      Breath sounds: Normal breath sounds. No stridor. No wheezing or rales.   Musculoskeletal:         General: Normal range of motion.      Cervical back: Neck supple.   Skin:     General: Skin is warm and dry.      Capillary Refill: Capillary refill takes less than 2 seconds.   Neurological:      Mental Status: He is alert.         POC rapid COVID-19 negative          "

## 2024-01-28 ENCOUNTER — ANESTHESIA (OUTPATIENT)
Dept: ANESTHESIOLOGY | Facility: HOSPITAL | Age: 59
End: 2024-01-28

## 2024-01-28 ENCOUNTER — ANESTHESIA EVENT (OUTPATIENT)
Dept: ANESTHESIOLOGY | Facility: HOSPITAL | Age: 59
End: 2024-01-28

## 2024-01-28 NOTE — ANESTHESIA PREPROCEDURE EVALUATION
Procedure:  PRE-OP ONLY    Relevant Problems   CARDIO   (+) Hyperlipidemia   (+) Hypertension      ENDO   (+) Type 2 diabetes mellitus with hemoglobin A1c goal of less than 7.0% ([facility])        Physical Exam    Airway    Mallampati score: II  TM Distance: >3 FB  Neck ROM: full     Dental   No notable dental hx     Cardiovascular  Rhythm: regular, Rate: normal    Pulmonary   Breath sounds clear to auscultation    Other Findings  post-pubertal.    Left Ventricle: Left ventricular cavity size is normal. Wall thickness is normal. The left ventricular ejection fraction is 65%. Systolic function is normal. Wall motion is normal. Diastolic function is normal.    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    Tricuspid Valve: There is mild regurgitation.     There is a echogenic  structure on ventricular aspect of aortic valve. Would recommend CANDY for better assessment.     Anesthesia Plan  ASA Score- 3     Anesthesia Type- general with ASA Monitors.         Additional Monitors:     Airway Plan: ETT.    Comment: On Ozempic, stomach contents visualized on ultrasound.       Plan Factors-Exercise tolerance (METS): >4 METS.    Chart reviewed. EKG reviewed. Imaging results reviewed. Existing labs reviewed. Patient summary reviewed.    Patient is not a current smoker. Patient not instructed to abstain from smoking on day of procedure. Patient did not smoke on day of surgery.    Obstructive sleep apnea risk education given perioperatively.        Induction- intravenous.    Postoperative Plan-     Informed Consent- Anesthetic plan and risks discussed with patient.  I personally reviewed this patient with the CRNA. Discussed and agreed on the Anesthesia Plan with the CRNA..              Normal sinus rhythm  Normal ECG  No previous ECGs available

## 2024-01-29 ENCOUNTER — ANESTHESIA (OUTPATIENT)
Dept: GASTROENTEROLOGY | Facility: HOSPITAL | Age: 59
End: 2024-01-29

## 2024-01-29 ENCOUNTER — ANESTHESIA EVENT (OUTPATIENT)
Dept: GASTROENTEROLOGY | Facility: HOSPITAL | Age: 59
End: 2024-01-29

## 2024-01-29 ENCOUNTER — HOSPITAL ENCOUNTER (OUTPATIENT)
Dept: GASTROENTEROLOGY | Facility: HOSPITAL | Age: 59
Setting detail: OUTPATIENT SURGERY
Discharge: HOME | End: 2024-01-29
Attending: HOSPITALIST | Admitting: HOSPITALIST
Payer: COMMERCIAL

## 2024-01-29 VITALS
HEART RATE: 71 BPM | BODY MASS INDEX: 34.4 KG/M2 | WEIGHT: 227 LBS | OXYGEN SATURATION: 97 % | HEIGHT: 68 IN | SYSTOLIC BLOOD PRESSURE: 157 MMHG | TEMPERATURE: 97.5 F | DIASTOLIC BLOOD PRESSURE: 86 MMHG | RESPIRATION RATE: 20 BRPM

## 2024-01-29 DIAGNOSIS — Z12.11 ENCOUNTER FOR SCREENING FOR MALIGNANT NEOPLASM OF COLON: ICD-10-CM

## 2024-01-29 LAB — GLUCOSE SERPL-MCNC: 140 MG/DL (ref 65–140)

## 2024-01-29 PROCEDURE — 82948 REAGENT STRIP/BLOOD GLUCOSE: CPT

## 2024-01-29 PROCEDURE — 88305 TISSUE EXAM BY PATHOLOGIST: CPT | Performed by: STUDENT IN AN ORGANIZED HEALTH CARE EDUCATION/TRAINING PROGRAM

## 2024-01-29 RX ORDER — LIDOCAINE HYDROCHLORIDE 10 MG/ML
INJECTION, SOLUTION EPIDURAL; INFILTRATION; INTRACAUDAL; PERINEURAL AS NEEDED
Status: DISCONTINUED | OUTPATIENT
Start: 2024-01-29 | End: 2024-01-29

## 2024-01-29 RX ORDER — SODIUM CHLORIDE, SODIUM LACTATE, POTASSIUM CHLORIDE, CALCIUM CHLORIDE 600; 310; 30; 20 MG/100ML; MG/100ML; MG/100ML; MG/100ML
INJECTION, SOLUTION INTRAVENOUS CONTINUOUS PRN
Status: DISCONTINUED | OUTPATIENT
Start: 2024-01-29 | End: 2024-01-29

## 2024-01-29 RX ORDER — PROPOFOL 10 MG/ML
INJECTION, EMULSION INTRAVENOUS AS NEEDED
Status: DISCONTINUED | OUTPATIENT
Start: 2024-01-29 | End: 2024-01-29

## 2024-01-29 RX ADMIN — Medication 40 MG: at 13:11

## 2024-01-29 RX ADMIN — PROPOFOL 50 MG: 10 INJECTION, EMULSION INTRAVENOUS at 13:07

## 2024-01-29 RX ADMIN — PROPOFOL 200 MG: 10 INJECTION, EMULSION INTRAVENOUS at 13:01

## 2024-01-29 RX ADMIN — PROPOFOL 50 MG: 10 INJECTION, EMULSION INTRAVENOUS at 13:12

## 2024-01-29 RX ADMIN — SODIUM CHLORIDE, SODIUM LACTATE, POTASSIUM CHLORIDE, AND CALCIUM CHLORIDE: .6; .31; .03; .02 INJECTION, SOLUTION INTRAVENOUS at 12:55

## 2024-01-29 RX ADMIN — PROPOFOL 30 MG: 10 INJECTION, EMULSION INTRAVENOUS at 13:04

## 2024-01-29 RX ADMIN — LIDOCAINE HYDROCHLORIDE 50 MG: 10 INJECTION, SOLUTION EPIDURAL; INFILTRATION; INTRACAUDAL at 13:01

## 2024-01-29 RX ADMIN — PROPOFOL 20 MG: 10 INJECTION, EMULSION INTRAVENOUS at 13:05

## 2024-01-29 NOTE — ANESTHESIA PREPROCEDURE EVALUATION
Procedure:  COLONOSCOPY    Relevant Problems   CARDIO   (+) Hyperlipidemia   (+) Hypertension      ENDO   (+) Type 2 diabetes mellitus with hemoglobin A1c goal of less than 7.0% ([facility])        Physical Exam    Airway    Mallampati score: II  TM Distance: >3 FB  Neck ROM: full     Dental   No notable dental hx     Cardiovascular  Rhythm: regular, Rate: normal    Pulmonary   Breath sounds clear to auscultation    Other Findings  post-pubertal.    Left Ventricle: Left ventricular cavity size is normal. Wall thickness is normal. The left ventricular ejection fraction is 65%. Systolic function is normal. Wall motion is normal. Diastolic function is normal.    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    Tricuspid Valve: There is mild regurgitation.     There is a echogenic  structure on ventricular aspect of aortic valve. Would recommend CANDY for better assessment.     Anesthesia Plan  ASA Score- 3     Anesthesia Type- IV sedation with anesthesia with ASA Monitors.         Additional Monitors:     Airway Plan:     Comment: On Ozempic, stomach contents visualized on ultrasound.       Plan Factors-Exercise tolerance (METS): >4 METS.    Chart reviewed. EKG reviewed. Imaging results reviewed. Existing labs reviewed. Patient summary reviewed.    Patient is not a current smoker. Patient not instructed to abstain from smoking on day of procedure. Patient did not smoke on day of surgery.    Obstructive sleep apnea risk education given perioperatively.        Induction- intravenous.    Postoperative Plan-     Informed Consent- Anesthetic plan and risks discussed with patient.  I personally reviewed this patient with the CRNA. Discussed and agreed on the Anesthesia Plan with the CRNA..              Normal sinus rhythm  Normal ECG  No previous ECGs available

## 2024-01-29 NOTE — ANESTHESIA POSTPROCEDURE EVALUATION
Post-Op Assessment Note    CV Status:  Stable    Pain management: adequate       Mental Status:  Sleepy   Hydration Status:  Stable   PONV Controlled:  None   Airway Patency:  Patent  Two or more mitigation strategies used for obstructive sleep apnea   Post Op Vitals Reviewed: Yes    No anethesia notable event occurred.    Staff: DEVYN               /79 (01/29/24 1323)    Temp (!) 97.3 °F (36.3 °C) (01/29/24 1323)    Pulse 64 (01/29/24 1323)   Resp 15 (01/29/24 1323)    SpO2 96 % (01/29/24 1323)

## 2024-01-29 NOTE — H&P
Procedure(s):  Colonoscopy with indication(s) of CRC screening      Endoscopy Pre-Procedure Assessment:  Prior to the procedure, the patient is identified.  The patient's history, medications, and allergies have been reviewed.  The patient is competent.  The risks and benefits of the procedure procedure and the planned sedation have been discussed with the patient.  All questions have been answered and informed consent for the procedure has been obtained.    Vitals:    01/29/24 1118   BP: 146/88   Pulse: 92   Resp: 20   Temp: 98.4 °F (36.9 °C)   SpO2: 97%       Physical Exam:  Physical Exam  HENT:      Nose: Nose normal.   Eyes:      Conjunctiva/sclera: Conjunctivae normal.   Cardiovascular:      Rate and Rhythm: Normal rate.   Pulmonary:      Effort: Pulmonary effort is normal.   Abdominal:      Palpations: Abdomen is soft.   Neurological:      General: No focal deficit present.      Mental Status: He is alert.   Psychiatric:         Mood and Affect: Mood normal.                Consent:    We have discussed the procedure in detail. We reviewed risks, benefits and alternative as well as protentional complications including and not limited to medication side effect , infection, bleeding, perforation and the potential need for surgery, ICU admission, CPR, as well as the need for blood product transfusion. Patient verbalized understanding and agreement. All patient questions were answered.     After reviewed the risks and benefits, the patient is deemed in satisfactory condition to undergo the procedure.  The anesthesia plan is to use monitored anesthesia care (MAC).      01/29/24

## 2024-01-31 PROCEDURE — 88305 TISSUE EXAM BY PATHOLOGIST: CPT | Performed by: STUDENT IN AN ORGANIZED HEALTH CARE EDUCATION/TRAINING PROGRAM

## 2024-02-21 ENCOUNTER — OFFICE VISIT (OUTPATIENT)
Dept: URGENT CARE | Facility: MEDICAL CENTER | Age: 59
End: 2024-02-21
Payer: COMMERCIAL

## 2024-02-21 VITALS
HEART RATE: 84 BPM | HEIGHT: 68 IN | TEMPERATURE: 97.9 F | OXYGEN SATURATION: 98 % | WEIGHT: 224.6 LBS | SYSTOLIC BLOOD PRESSURE: 124 MMHG | BODY MASS INDEX: 34.04 KG/M2 | DIASTOLIC BLOOD PRESSURE: 78 MMHG | RESPIRATION RATE: 18 BRPM

## 2024-02-21 DIAGNOSIS — R42 DIZZY: Primary | ICD-10-CM

## 2024-02-21 LAB — GLUCOSE SERPL-MCNC: 154 MG/DL (ref 65–140)

## 2024-02-21 PROCEDURE — 99213 OFFICE O/P EST LOW 20 MIN: CPT | Performed by: NURSE PRACTITIONER

## 2024-02-21 PROCEDURE — 82948 REAGENT STRIP/BLOOD GLUCOSE: CPT | Performed by: NURSE PRACTITIONER

## 2024-02-21 NOTE — PROGRESS NOTES
[facility]        NAME: Kelvin Cuevas is a 58 y.o. male  : 1965    MRN: [id number]  DATE: 2024  TIME: 10:55 AM    Assessment and Plan   Dizzy [R42]  1. Dizzy  ECG 12 lead        EKG shows NSR, tk=406, orthostatic blood pressure reads wnl.    Patient Instructions     Proceed to  ER for further evaluation, patient agreeable to plan of care.    Chief Complaint     Chief Complaint   Patient presents with    Dizziness     Began with dizziness when getting up from lying position, since , Denies numbness or tingling. C/O congestion in head and throat for past 1 month.         History of Present Illness       Dizziness  This is a new problem. Episode onset: 4 days ago. Episode frequency: only while supine. The problem has been unchanged. Associated symptoms include vertigo. Pertinent negatives include no abdominal pain, arthralgias, chest pain, chills, coughing, diaphoresis, fatigue, fever, headaches, joint swelling, myalgias, nausea, numbness, urinary symptoms, visual change, vomiting or weakness. He has tried nothing for the symptoms.       Review of Systems   Review of Systems   Constitutional:  Negative for chills, diaphoresis, fatigue and fever.   HENT: Negative.     Eyes:  Negative for photophobia and visual disturbance.   Respiratory:  Negative for cough, chest tightness, shortness of breath, wheezing and stridor.    Cardiovascular:  Negative for chest pain and palpitations.   Gastrointestinal:  Negative for abdominal pain, nausea and vomiting.   Musculoskeletal:  Negative for arthralgias, back pain, joint swelling and myalgias.   Neurological:  Positive for dizziness and vertigo. Negative for tremors, seizures, syncope, facial asymmetry, speech difficulty, weakness, light-headedness, numbness and headaches.         Current Medications       Current Outpatient Medications:     amLODIPine (NORVASC) 10 mg tablet, Take 10 mg by mouth every morning, Disp: , Rfl:     Aspirin 81 MG  CAPS, Take 81 mg by mouth daily, Disp: , Rfl:     Continuous Blood Gluc Sensor (FreeStyle Tyrell 3 Sensor) MISC, Use as directed, Disp: , Rfl:     Empagliflozin (JARDIANCE) 10 MG TABS tablet, Take 10 mg by mouth every morning, Disp: , Rfl:     ergocalciferol (VITAMIN D2) 50,000 units, Take 50,000 Units by mouth every 14 (fourteen) days, Disp: , Rfl:     fluticasone (FLONASE) 50 mcg/act nasal spray, 1 spray into each nostril daily (Patient taking differently: 1 spray into each nostril if needed for rhinitis or allergies), Disp: 9.9 mL, Rfl: 0    loratadine (Claritin) 10 mg tablet, Take 10 mg by mouth if needed for allergies, Disp: , Rfl:     losartan (COZAAR) 100 MG tablet, Take 100 mg by mouth every morning, Disp: , Rfl:     metFORMIN (GLUCOPHAGE) 500 mg tablet, Take 500 mg by mouth 2 (two) times a day with meals, Disp: , Rfl:     oxybutynin (DITROPAN-XL) 10 MG 24 hr tablet, Take 1 tablet (10 mg total) by mouth daily at bedtime, Disp: 90 tablet, Rfl: 3    rosuvastatin (CRESTOR) 10 MG tablet, Take 10 mg by mouth every morning, Disp: , Rfl:     semaglutide, 0.25 or 0.5 mg/dose, (Ozempic, 0.25 or 0.5 MG/DOSE,) 2 mg/3 mL injection pen, Inject 0.5 mg under the skin every 7 days Takes on Sundays, Disp: , Rfl:     tadalafil (CIALIS) 20 MG tablet, Take 1 tablet (20 mg total) by mouth daily as needed for erectile dysfunction, Disp: 30 tablet, Rfl: 3    meloxicam (Mobic) 15 mg tablet, Take 1 tablet (15 mg total) by mouth daily (Patient not taking: Reported on 2/21/2024), Disp: 14 tablet, Rfl: 0    Current Allergies     Allergies as of 02/21/2024 - Reviewed 02/21/2024   Allergen Reaction Noted    Hydrochlorothiazide GI Intolerance 06/26/2023    Lisinopril Cough 06/26/2023            The following portions of the patient's history were reviewed and updated as appropriate: allergies, current medications, past family history, past medical history, past social history, past surgical history and problem list.     Past Medical  "History:   Diagnosis Date    Allergic     CPAP (continuous positive airway pressure) dependence     Dyslipidemia     Erectile dysfunction     GERD (gastroesophageal reflux disease)     Hypertension     Sleep apnea     Type 2 diabetes mellitus (HCC)        Past Surgical History:   Procedure Laterality Date    ANKLE SURGERY Left     COLONOSCOPY         History reviewed. No pertinent family history.      Medications have been verified.        Objective   /78 (Patient Position: Standing)   Pulse 84   Temp 97.9 °F (36.6 °C) (Temporal)   Resp 18   Ht 5' 8\" (1.727 m)   Wt 102 kg (224 lb 9.6 oz)   SpO2 98%   BMI 34.15 kg/m²   No LMP for male patient.       Physical Exam     Physical Exam  Constitutional:       General: He is not in acute distress.     Appearance: He is well-developed. He is not diaphoretic.   Eyes:      Extraocular Movements: Extraocular movements intact.      Conjunctiva/sclera: Conjunctivae normal.      Pupils: Pupils are equal, round, and reactive to light.   Cardiovascular:      Rate and Rhythm: Normal rate and regular rhythm.      Pulses: Normal pulses.      Heart sounds: Normal heart sounds, S1 normal and S2 normal.   Pulmonary:      Effort: Pulmonary effort is normal.      Breath sounds: Normal breath sounds.   Abdominal:      General: Bowel sounds are normal. There is no distension.      Palpations: Abdomen is soft. Abdomen is not rigid. There is no shifting dullness or mass.      Tenderness: There is no abdominal tenderness. There is no guarding or rebound. Negative signs include Hoffmann's sign and McBurney's sign.   Skin:     General: Skin is warm and dry.      Capillary Refill: Capillary refill takes less than 2 seconds.   Neurological:      General: No focal deficit present.      Mental Status: He is alert and oriented to person, place, and time.      GCS: GCS eye subscore is 4. GCS verbal subscore is 5. GCS motor subscore is 6.      Cranial Nerves: Cranial nerves 2-12 are intact.    "   Sensory: Sensation is intact.      Motor: Motor function is intact.      Coordination: Coordination is intact.      Gait: Gait is intact.

## 2024-04-28 DIAGNOSIS — R39.15 URINARY URGENCY: ICD-10-CM

## 2024-04-30 RX ORDER — OXYBUTYNIN CHLORIDE 10 MG/1
10 TABLET, EXTENDED RELEASE ORAL
Qty: 90 TABLET | Refills: 1 | Status: SHIPPED | OUTPATIENT
Start: 2024-04-30

## 2024-10-15 DIAGNOSIS — N52.9 ERECTILE DYSFUNCTION, UNSPECIFIED ERECTILE DYSFUNCTION TYPE: ICD-10-CM

## 2024-10-16 RX ORDER — TADALAFIL 20 MG/1
TABLET ORAL
Qty: 30 TABLET | Refills: 0 | Status: SHIPPED | OUTPATIENT
Start: 2024-10-16

## 2024-11-20 ENCOUNTER — TELEPHONE (OUTPATIENT)
Dept: UROLOGY | Facility: CLINIC | Age: 59
End: 2024-11-20

## 2024-11-20 DIAGNOSIS — R35.1 NOCTURIA: Primary | ICD-10-CM

## 2024-11-20 RX ORDER — EMPAGLIFLOZIN 25 MG/1
25 TABLET, FILM COATED ORAL EVERY MORNING
COMMUNITY
Start: 2024-09-08

## 2024-11-20 RX ORDER — BETAMETHASONE DIPROPIONATE 0.5 MG/G
CREAM TOPICAL 2 TIMES DAILY
COMMUNITY
Start: 2024-01-23

## 2024-11-20 RX ORDER — PANTOPRAZOLE SODIUM 40 MG/1
40 TABLET, DELAYED RELEASE ORAL EVERY MORNING
COMMUNITY
Start: 2024-09-10

## 2024-11-20 RX ORDER — KETOCONAZOLE 20 MG/ML
SHAMPOO, SUSPENSION TOPICAL
COMMUNITY
Start: 2024-10-28 | End: 2024-11-25

## 2024-11-20 RX ORDER — SEMAGLUTIDE 1.34 MG/ML
INJECTION, SOLUTION SUBCUTANEOUS
COMMUNITY
Start: 2024-10-07

## 2024-11-20 RX ORDER — DICYCLOMINE HYDROCHLORIDE 10 MG/1
10 CAPSULE ORAL
COMMUNITY
Start: 2024-10-28

## 2024-11-20 NOTE — TELEPHONE ENCOUNTER
Left message for pt to call office back. Last year Dr. Johnson recommended cystoscopy. Is pt interested in cystoscopy. Please also have pt complete psa testing for upcoming appt. If going outside [email or web address] system please find out where pt is going and fax order. Order in.

## 2024-11-21 NOTE — TELEPHONE ENCOUNTER
Pt returning call in regards to upcoming appt on 12/4/24.     Pt requesting to cx appt and offered to help him reschedule now. Pt declined to reschedule at this time due to he is at work and will need to look at his schedule. Will call back to r/s.

## 2025-01-29 DIAGNOSIS — R09.89 OTHER SPECIFIED SYMPTOMS AND SIGNS INVOLVING THE CIRCULATORY AND RESPIRATORY SYSTEMS: ICD-10-CM

## 2025-03-03 ENCOUNTER — HOSPITAL ENCOUNTER (OUTPATIENT)
Dept: NON INVASIVE DIAGNOSTICS | Facility: HOSPITAL | Age: 60
Discharge: HOME | End: 2025-03-03
Payer: COMMERCIAL

## 2025-03-03 DIAGNOSIS — R09.89 OTHER SPECIFIED SYMPTOMS AND SIGNS INVOLVING THE CIRCULATORY AND RESPIRATORY SYSTEMS: ICD-10-CM

## 2025-03-03 PROCEDURE — 93923 UPR/LXTR ART STDY 3+ LVLS: CPT | Performed by: SURGERY

## 2025-03-03 PROCEDURE — 93923 UPR/LXTR ART STDY 3+ LVLS: CPT

## 2025-03-21 ENCOUNTER — ANESTHESIA (OUTPATIENT)
Dept: ANESTHESIOLOGY | Facility: HOSPITAL | Age: 60
End: 2025-03-21

## 2025-03-21 ENCOUNTER — ANESTHESIA EVENT (OUTPATIENT)
Dept: ANESTHESIOLOGY | Facility: HOSPITAL | Age: 60
End: 2025-03-21

## 2025-03-21 NOTE — ANESTHESIA PREPROCEDURE EVALUATION
Procedure:  PRE-OP ONLY    Relevant Problems   CARDIO   (+) Hyperlipidemia   (+) Hypertension      ENDO   (+) Type 2 diabetes mellitus with hemoglobin A1c goal of less than 7.0% (HCC)      Neurology/Sleep   (+) Vertebral artery stenosis      Cardiovascular/Peripheral Vascular   (+) Nonrheumatic aortic valve sclerosis      OSAS    Normal sinus rhythm  Normal ECG  When compared with ECG of 07-NOV-2023 11:08,  QT has shortened       Physical Exam    Airway    Mallampati score: II  TM Distance: >3 FB  Neck ROM: full     Dental   No notable dental hx     Cardiovascular  Rhythm: regular, Rate: normal    Pulmonary   Breath sounds clear to auscultation    Other Findings  post-pubertal.    Left Ventricle: Left ventricular cavity size is normal. Wall thickness is normal. The left ventricular ejection fraction is 65%. Systolic function is normal. Wall motion is normal. Diastolic function is normal.    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    Tricuspid Valve: There is mild regurgitation.     There is a echogenic  structure on ventricular aspect of aortic valve. Would recommend CANDY for better assessment.     Anesthesia Plan  ASA Score- 3     Anesthesia Type- IV sedation with anesthesia with ASA Monitors.         Additional Monitors:     Airway Plan:     Comment: On Ozempic, stomach contents visualized on ultrasound.       Plan Factors-Exercise tolerance (METS): >4 METS.    Chart reviewed. EKG reviewed. Imaging results reviewed. Existing labs reviewed. Patient summary reviewed.    Patient is not a current smoker. Patient not instructed to abstain from smoking on day of procedure. Patient did not smoke on day of surgery.    Obstructive sleep apnea risk education given perioperatively.        Induction- intravenous.    Postoperative Plan-         Informed Consent- Anesthetic plan and risks discussed with patient.  I personally  reviewed this patient with the CRNA. Discussed and agreed on the Anesthesia Plan with the CRNA..      Normal sinus rhythm  Normal ECG  No previous ECGs available

## 2025-03-24 ENCOUNTER — ANESTHESIA (OUTPATIENT)
Dept: GASTROENTEROLOGY | Facility: HOSPITAL | Age: 60
End: 2025-03-24
Payer: COMMERCIAL

## 2025-03-24 ENCOUNTER — HOSPITAL ENCOUNTER (OUTPATIENT)
Dept: GASTROENTEROLOGY | Facility: HOSPITAL | Age: 60
Setting detail: OUTPATIENT SURGERY
Discharge: HOME | End: 2025-03-24
Attending: HOSPITALIST
Payer: COMMERCIAL

## 2025-03-24 ENCOUNTER — ANESTHESIA EVENT (OUTPATIENT)
Dept: GASTROENTEROLOGY | Facility: HOSPITAL | Age: 60
End: 2025-03-24
Payer: COMMERCIAL

## 2025-03-24 VITALS
HEART RATE: 76 BPM | HEIGHT: 68 IN | RESPIRATION RATE: 22 BRPM | TEMPERATURE: 97.3 F | DIASTOLIC BLOOD PRESSURE: 63 MMHG | SYSTOLIC BLOOD PRESSURE: 120 MMHG | OXYGEN SATURATION: 97 % | BODY MASS INDEX: 34.1 KG/M2 | WEIGHT: 225 LBS

## 2025-03-24 DIAGNOSIS — R13.19 ESOPHAGEAL DYSPHAGIA: ICD-10-CM

## 2025-03-24 LAB
GLUCOSE SERPL-MCNC: 167 MG/DL (ref 65–140)
GLUCOSE SERPL-MCNC: 170 MG/DL (ref 65–140)

## 2025-03-24 PROCEDURE — C1769 GUIDE WIRE: HCPCS

## 2025-03-24 PROCEDURE — 82948 REAGENT STRIP/BLOOD GLUCOSE: CPT

## 2025-03-24 PROCEDURE — 88305 TISSUE EXAM BY PATHOLOGIST: CPT | Performed by: PATHOLOGY

## 2025-03-24 RX ORDER — PROPOFOL 10 MG/ML
INJECTION, EMULSION INTRAVENOUS AS NEEDED
Status: DISCONTINUED | OUTPATIENT
Start: 2025-03-24 | End: 2025-03-24

## 2025-03-24 RX ORDER — SODIUM CHLORIDE, SODIUM LACTATE, POTASSIUM CHLORIDE, CALCIUM CHLORIDE 600; 310; 30; 20 MG/100ML; MG/100ML; MG/100ML; MG/100ML
INJECTION, SOLUTION INTRAVENOUS CONTINUOUS PRN
Status: DISCONTINUED | OUTPATIENT
Start: 2025-03-24 | End: 2025-03-24

## 2025-03-24 RX ORDER — LIDOCAINE HYDROCHLORIDE 20 MG/ML
INJECTION, SOLUTION EPIDURAL; INFILTRATION; INTRACAUDAL; PERINEURAL AS NEEDED
Status: DISCONTINUED | OUTPATIENT
Start: 2025-03-24 | End: 2025-03-24

## 2025-03-24 RX ADMIN — PROPOFOL 100 MG: 10 INJECTION, EMULSION INTRAVENOUS at 07:27

## 2025-03-24 RX ADMIN — TOPICAL ANESTHETIC 1 SPRAY: 200 SPRAY DENTAL; PERIODONTAL at 07:46

## 2025-03-24 RX ADMIN — Medication 40 MG: at 07:30

## 2025-03-24 RX ADMIN — LIDOCAINE HYDROCHLORIDE 100 MG: 20 INJECTION, SOLUTION EPIDURAL; INFILTRATION; INTRACAUDAL; PERINEURAL at 07:27

## 2025-03-24 RX ADMIN — SODIUM CHLORIDE, SODIUM LACTATE, POTASSIUM CHLORIDE, AND CALCIUM CHLORIDE: .6; .31; .03; .02 INJECTION, SOLUTION INTRAVENOUS at 07:24

## 2025-03-24 RX ADMIN — PROPOFOL 150 MCG/KG/MIN: 10 INJECTION, EMULSION INTRAVENOUS at 07:28

## 2025-03-24 NOTE — ANESTHESIA PREPROCEDURE EVALUATION
Procedure:  EGD    Relevant Problems   CARDIO   (+) Hyperlipidemia   (+) Hypertension   (+) Nonrheumatic aortic valve sclerosis   (+) Vertebral artery stenosis      ENDO   (+) Type 2 diabetes mellitus with hemoglobin A1c goal of less than 7.0% (HCC)      OSAS    Normal sinus rhythm  Normal ECG  When compared with ECG of 07-NOV-2023 11:08,  QT has shortened       Physical Exam    Airway    Mallampati score: II  TM Distance: >3 FB  Neck ROM: full     Dental   No notable dental hx     Cardiovascular  Rhythm: regular, Rate: normal    Pulmonary   Breath sounds clear to auscultation    Other Findings  post-pubertal.    Left Ventricle: Left ventricular cavity size is normal. Wall thickness is normal. The left ventricular ejection fraction is 65%. Systolic function is normal. Wall motion is normal. Diastolic function is normal.    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    Tricuspid Valve: There is mild regurgitation.     There is a echogenic  structure on ventricular aspect of aortic valve. Would recommend CANDY for better assessment.     Anesthesia Plan  ASA Score- 3     Anesthesia Type- IV sedation with anesthesia with ASA Monitors.         Additional Monitors:     Airway Plan:     Comment: On Ozempic, stomach contents visualized on ultrasound.       Plan Factors-Exercise tolerance (METS): >4 METS.    Chart reviewed. EKG reviewed. Imaging results reviewed. Existing labs reviewed. Patient summary reviewed.    Patient is not a current smoker. Patient not instructed to abstain from smoking on day of procedure. Patient did not smoke on day of surgery.    Obstructive sleep apnea risk education given perioperatively.        Induction- intravenous.    Postoperative Plan-         Informed Consent- Anesthetic plan and risks discussed with patient.  I personally reviewed this patient with the CRNA. Discussed and agreed on the Anesthesia  Plan with the CRNA..      Normal sinus rhythm  Normal ECG  No previous ECGs available

## 2025-03-24 NOTE — ANESTHESIA POSTPROCEDURE EVALUATION
Post-Op Assessment Note    CV Status:  Stable    Pain management: adequate       Mental Status:  Sleepy   Hydration Status:  Euvolemic   PONV Controlled:  Controlled   Airway Patency:  Patent     Post Op Vitals Reviewed: Yes    No anethesia notable event occurred.    Staff: CRNA           Last Filed PACU Vitals:  Vitals Value Taken Time   Temp 98.8    Pulse 75    /63    Resp 18    SpO2 94

## 2025-03-24 NOTE — H&P
Procedure(s):    Esophagogastroduodenuoscopy with the indication(s) of dysphagia and GERD symptoms      Vitals:    03/24/25 0645   BP: 131/77   Pulse: 75   Resp: 18   Temp: 97.6 °F (36.4 °C)   SpO2: 97%       Physical Exam:  Physical Exam  HENT:      Nose: Nose normal.   Eyes:      Conjunctiva/sclera: Conjunctivae normal.   Cardiovascular:      Rate and Rhythm: Normal rate.   Pulmonary:      Effort: Pulmonary effort is normal.   Abdominal:      Palpations: Abdomen is soft.   Neurological:      General: No focal deficit present.      Mental Status: He is alert.   Psychiatric:         Mood and Affect: Mood normal.              Endoscopy Pre-Procedure Assessment:  Prior to the procedure, the patient is identified.  The patient's history, medications, and allergies have been reviewed.  The patient is competent.     Consent:    We have discussed the procedure in detail. We reviewed risks, benefits and alternative as well as potentional complications including and not limited to missed lesion or polyp,medication side effect , infection, bleeding, perforation and the potential need for surgery, ICU admission, CPR, as well as the need for blood product transfusion. Patient verbalized understanding and agreement. All patient questions were answered.     After reviewed the risks and benefits, the patient is deemed in satisfactory condition to undergo the procedure.  The anesthesia plan is to use monitored anesthesia care (MAC).      03/24/25

## 2025-03-28 PROCEDURE — 88305 TISSUE EXAM BY PATHOLOGIST: CPT | Performed by: PATHOLOGY

## 2025-04-21 DIAGNOSIS — R13.19 OTHER DYSPHAGIA: ICD-10-CM

## 2025-05-14 ENCOUNTER — HOSPITAL ENCOUNTER (OUTPATIENT)
Dept: RADIOLOGY | Facility: HOSPITAL | Age: 60
Discharge: HOME | End: 2025-05-14
Attending: PHYSICIAN ASSISTANT
Payer: COMMERCIAL

## 2025-05-14 DIAGNOSIS — R13.19 OTHER DYSPHAGIA: ICD-10-CM

## 2025-05-14 PROCEDURE — 74220 X-RAY XM ESOPHAGUS 1CNTRST: CPT

## 2025-05-14 RX ADMIN — IOHEXOL 50 ML: 350 INJECTION, SOLUTION INTRAVENOUS at 09:05
